# Patient Record
Sex: MALE | Race: WHITE | NOT HISPANIC OR LATINO | Employment: OTHER | URBAN - NONMETROPOLITAN AREA
[De-identification: names, ages, dates, MRNs, and addresses within clinical notes are randomized per-mention and may not be internally consistent; named-entity substitution may affect disease eponyms.]

---

## 2020-11-06 DIAGNOSIS — N21.0 BLADDER STONE: Primary | ICD-10-CM

## 2020-11-09 NOTE — PROGRESS NOTES
Mr. Beltran is 65 y.o. male    CHIEF COMPLAINT: Urinary Incontinence. Imaging was done at [facility] and brought to clinic on Disc. Imaging was also done at [facility].     HPI  This 65-year-old white male is here today for significant male lower urinary tract symptoms.  These have been going on for about 6 to 7 months.  Please see the medical record summary below.  Basically he presented with a right distal ureteral stone and multiple stones of the bladder prostatic urethra.  After endoscopic intervention by Dr. Spain patient had done reasonably well until about 2 months ago.  He has had progressive difficulty with lower urinary tract symptoms of urgency and frequency.  Said his stream has weakened somewhat as well.  He has had some intermittent gross hematuria.  He denies any episodes of urinary tract infection.  He has had no flank pain.    The following portions of the patient's history were reviewed and updated as appropriate: allergies, current medications, past family history, past medical history, past social history, past surgical history and problem list.      Review of Systems   Constitutional: Negative for appetite change and fever.   HENT: Negative for hearing loss and sore throat.    Eyes: Negative for pain and redness.   Respiratory: Negative for cough and shortness of breath.    Cardiovascular: Negative for chest pain and leg swelling.   Gastrointestinal: Negative for anal bleeding, nausea and vomiting.   Endocrine: Negative for cold intolerance and heat intolerance.   Genitourinary: Positive for frequency and urgency. Negative for dysuria, flank pain and hematuria.   Musculoskeletal: Negative for joint swelling and myalgias.   Skin: Negative for color change and rash.   Allergic/Immunologic: Negative for immunocompromised state.   Neurological: Negative for dizziness and speech difficulty.   Hematological: Negative for adenopathy. Does not bruise/bleed easily.   Psychiatric/Behavioral: Negative for  "dysphoric mood and suicidal ideas.         Current Outpatient Medications:   •  finasteride (PROSCAR) 5 MG tablet, Take 5 mg by mouth Daily., Disp: , Rfl:   •  nitrofurantoin (MACRODANTIN) 50 MG capsule, Take 50 mg by mouth 4 (Four) Times a Day., Disp: , Rfl:   •  tamsulosin (FLOMAX) 0.4 MG capsule 24 hr capsule, Take 1 capsule by mouth Daily., Disp: , Rfl:     Past Medical History:   Diagnosis Date   • Arthritis    • Bladder stones    • Kidney stones    • Urinary incontinence        Past Surgical History:   Procedure Laterality Date   • KIDNEY STONE SURGERY         Social History     Socioeconomic History   • Marital status:      Spouse name: Not on file   • Number of children: Not on file   • Years of education: Not on file   • Highest education level: Not on file   Tobacco Use   • Smoking status: Never Smoker   • Smokeless tobacco: Never Used   Substance and Sexual Activity   • Alcohol use: Yes     Comment: occasional   • Sexual activity: Defer       Family History   Problem Relation Age of Onset   • Sudden death Neg Hx    • Urolithiasis Neg Hx          Temp 97.6 °F (36.4 °C)   Ht 175.3 cm (69\")   Wt 89.5 kg (197 lb 6.4 oz)   BMI 29.15 kg/m²       Physical Exam  Constitutional: Well nourished, Well developed; No apparent distress.  His vital signs are reviewed  Psychiatric: Appropriate affect; Alert and oriented  Eyes: Unremarkable  Musculoskeletal: Normal gait and station  GI: Abdomen is soft, non-tender  Respiratory: No distress; Unlabored movement; No accessory musculature needed with symmetric movements  Skin: No pallor or diaphoresis  ; Penis and testicles are normal; Prostate 30 mL with some asymmetry with the right side appearing atrophic not nodular compared to the left.          Data  Results for orders placed or performed in visit on 11/17/20   POC Urinalysis Dipstick, Multipro    Specimen: Urine   Result Value Ref Range    Color Yellow Yellow, Straw, Dark Yellow, [place]    Clarity, UA Clear " Clear    Glucose, UA Negative Negative, 1000 mg/dL (3+) mg/dL    Bilirubin Negative Negative    Ketones, UA Negative Negative    Specific Gravity  1.020 1.005 - 1.030    Blood, UA Trace (A) Negative    pH, Urine 5.5 5.0 - 8.0    Protein, POC Trace (A) Negative mg/dL    Urobilinogen, UA Normal Normal    Nitrite, UA Negative Negative    Leukocytes Trace (A) Negative       International Prostate Symptom Score  The following is posted based on patient questionnaire answers:  0 - not at all    1-7 mild symptoms  1- Less than one time in five  8-19 moderate symptoms  2 -Less than half the time  20-35 severe symptoms  3 - About half the time  4 - More than half the time  5 - Almost always     For following sections:  Incomplete Emptying: - How often have you had the sensation  of not emptying your bladder completely after you finished urinating?  2  Frequency: -How often have you had to urinate again less than   two hours after you finished urinating?      4  Intermittency: -How often have you found you stopped and started again  Several times when you urinate?       0  Urgency: -How often do you find it difficult to postpone urination?             2  Weak stream: - How often have you had a weak urinary stream?             1  Straining: - How often have you had to push or strain to begin  Urination?          0  Sleeping: -How many times did you most typically get up to urinate   From the time you went to bed at night until the time you got up in the   2  Morning          Total `  11    Quality of Life  How would you feel if you had to live with your urinary condition the way   4  It is now, no better, no worse for the rest of your life?    Where: 0=delighted; 1= pleased, 2= mostly satisfied, 3= mixed, 4 = mostly  Dissatisfied, 5= Unhappy, 6 = terrible    Bladder Scan interpretation  Estimation of residual urine via abdominal ultrasound  Residual Urine: 36ml  Indication: Urinary Incontinence  Position: Supine  Examination:  Incremental scanning of the suprapubic area using 3 MHz transducer using copious amounts of acoustic gel.   Findings: An anechoic area was demonstrated which represented the bladder, with measurement of residual urine as noted. I inspected this myself. In that the residual urine was stable or insignificant, no treatment will be necessary at this time.     CT ABDOMEN PELVIS WO CONTRAST- 11/13/2020 12:39 PM CST     HISTORY: Bladder stone; N21.0-Calculus in bladder, previous lithotripsy.     DOSE: 590 mGycm (Automatic exposure control technique was implemented in  an effort to keep the radiation dose as low as possible without  compromising image quality)     REPORT: Spiral CT of the abdomen and pelvis was performed without  contrast from the lung bases through the pubic symphysis. Reconstructed  coronal and sagittal images are also reviewed.     COMPARISON: There are no correlative imaging studies for comparison.     Review of lung windows demonstrates normal aeration of the lung bases.  There is a 2 mm subpleural nodule in the right middle lobe axial image  13 which is too small to fully characterize but likely benign. There is  mild elevation of the right hemidiaphragm. Decreased attenuation of the  liver parenchyma is noted diffusely compatible with steatosis. The  spleen is homogeneous, normal in size. The stomach is decompressed.  There is a group of coarse calcifications along the posterior margin of  the body the pancreas which in a measure about 2.1 cm. This appears  associated with the nodule slightly larger size, which is inseparable  from the pancreas, this could less likely represent a splenic artery  aneurysm. The adrenal glands are normal. There is a punctate  nephrolithiasis at the inferior pole the right kidney.. The renal  contours are smooth. There is no hydronephrosis. The ureters are  decompressed. There 2 small stones in the bladder, measuring  approximately 9.6 and 7.6 mm. Bladder wall thickening  is present and  there is limited bladder distention. Moderate prostate enlargement is  noted. No free fluid or free air is identified. Bowel loops are normal  caliber. There is mild sigmoid diverticulosis, sigmoid colon is  redundant. The appendix is normal. Review of bone windows shows no acute  abnormality. There are vacuum discs at L3-4 and L4-5, as well as L3-4  with disc space collapse at each of these levels.     IMPRESSION:  1. No ureterolithiasis is identified, there is a punctate  nephrolithiasis at the inferior pole of the right kidney. There are 2  stone fragments in the bladder, measuring approximately 9.6 and 7.6 mm.  There is also circumferential bladder wall thickening.  2. Follow-up CT of the abdomen and pelvis using pancreatic mass protocol  is recommended to further evaluate a partially calcified 2.2 cm nodule  along the posterior pancreatic body, which is difficult to separate from  pancreas as well as the splenic artery. A partially calcified pancreatic  mass and a splenic artery aneurysm are both in the differential  diagnosis.  3. Moderate prostate enlargement and probable BPH.  4. Mild sigmoid diverticulosis without diverticulitis. Normal appendix.  5. Advanced degenerative changes at multiple levels in the lumbar spine.  6. Hepatic steatosis.  This report was finalized on 11/13/2020 12:53 by Dr. Bulmaro Nassar MD.    These images were made available to me to review independently.  I also reviewed the radiologist's report described above with regard to the urologic findings.   /Иван Mejia MD    Medical Records Summary:  Available to me today are  medical records from Dr. Spain, urologist in [place], that can be summarized as follows:   The patient presented to Dr. Spain on 5/7/2020 with right lower quadrant pain associated with nausea vomiting consistent with renal colic.  A CT done using stone protocol showed what was felt to be an 8 mm right distal ureteral stone with mild  right hydroureteronephrosis but no stones in either kidney.  Patient was taken the operating room where he underwent right ureteroscopic laser lithotripsy as well as treatment of multiple large bladder and prostatic urethral stones.  When seen back in the office on 5/12/2020 he continued to have significant obstructive voiding symptoms and it was recommended he consider greenlight laser ablation of the prostate.  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  At that point he was put on tamsulosin 0.4 mg/day.  He did undergo a renal sonogram on 6/4/2020 which showed enlarged prostate gland but no evidence of hydronephrosis, renal atrophy or stones.  Ultimately he opted to stay on the tamsulosin.      Assessment and Plan  Diagnoses and all orders for this visit:    1. Bladder calculus (Primary)  -     POC Urinalysis Dipstick, Multipro  -     Case Request; Standing  -     sodium chloride 0.9 % infusion  -     ceFAZolin (ANCEF) 2 g in sodium chloride 0.9 % 100 mL IVPB  -     Case Request    2. BPH with obstruction/lower urinary tract symptoms    Other orders  -     Follow Anesthesia Guidelines / Protocol; Future  -     Obtain Informed Consent; Future  -     Provide NPO Instructions to Patient; Future  -     Follow Anesthesia Guidelines / Protocol; Standing  -     Verify NPO Status; Standing      -Patient still has small stones apparently left in the bladder and the urethra.  He is likely made some new ones in this length of time.  I think ultimately he is going to require some relief of his bladder outlet obstruction.  My plan would be to go in now and try to remove these bladder stones and assess the inside the prostate urethra as well as bladder.  Ultimately I would probably do a transurethral section prostate on the patient which I briefly went over.  At this point we are under a crisis  with capacity due to the coronavirus and have had to postpone any procedures that might require hospitalization except an emergent or extreme situations.  At this time he is relatively asymptomatic so I am going to do a diagnostic exam on him and just try to get out what stones I can which should allow me to treat him as an outpatient.  We talked about the risk of bladder perforation and significant hematuria that could even cause retention in the postoperative period.    (Please note that portions of this note were completed with a voice recognition program.)  Иван Mejia MD  11/18/20  10:22 CST

## 2020-11-09 NOTE — H&P (VIEW-ONLY)
Mr. Beltran is 65 y.o. male    CHIEF COMPLAINT: Urinary Incontinence. Imaging was done at [facility] and brought to clinic on Disc. Imaging was also done at [facility].     HPI  This 65-year-old white male is here today for significant male lower urinary tract symptoms.  These have been going on for about 6 to 7 months.  Please see the medical record summary below.  Basically he presented with a right distal ureteral stone and multiple stones of the bladder prostatic urethra.  After endoscopic intervention by Dr. Spain patient had done reasonably well until about 2 months ago.  He has had progressive difficulty with lower urinary tract symptoms of urgency and frequency.  Said his stream has weakened somewhat as well.  He has had some intermittent gross hematuria.  He denies any episodes of urinary tract infection.  He has had no flank pain.    The following portions of the patient's history were reviewed and updated as appropriate: allergies, current medications, past family history, past medical history, past social history, past surgical history and problem list.      Review of Systems   Constitutional: Negative for appetite change and fever.   HENT: Negative for hearing loss and sore throat.    Eyes: Negative for pain and redness.   Respiratory: Negative for cough and shortness of breath.    Cardiovascular: Negative for chest pain and leg swelling.   Gastrointestinal: Negative for anal bleeding, nausea and vomiting.   Endocrine: Negative for cold intolerance and heat intolerance.   Genitourinary: Positive for frequency and urgency. Negative for dysuria, flank pain and hematuria.   Musculoskeletal: Negative for joint swelling and myalgias.   Skin: Negative for color change and rash.   Allergic/Immunologic: Negative for immunocompromised state.   Neurological: Negative for dizziness and speech difficulty.   Hematological: Negative for adenopathy. Does not bruise/bleed easily.   Psychiatric/Behavioral: Negative for  "dysphoric mood and suicidal ideas.         Current Outpatient Medications:   •  finasteride (PROSCAR) 5 MG tablet, Take 5 mg by mouth Daily., Disp: , Rfl:   •  nitrofurantoin (MACRODANTIN) 50 MG capsule, Take 50 mg by mouth 4 (Four) Times a Day., Disp: , Rfl:   •  tamsulosin (FLOMAX) 0.4 MG capsule 24 hr capsule, Take 1 capsule by mouth Daily., Disp: , Rfl:     Past Medical History:   Diagnosis Date   • Arthritis    • Bladder stones    • Kidney stones    • Urinary incontinence        Past Surgical History:   Procedure Laterality Date   • KIDNEY STONE SURGERY         Social History     Socioeconomic History   • Marital status:      Spouse name: Not on file   • Number of children: Not on file   • Years of education: Not on file   • Highest education level: Not on file   Tobacco Use   • Smoking status: Never Smoker   • Smokeless tobacco: Never Used   Substance and Sexual Activity   • Alcohol use: Yes     Comment: occasional   • Sexual activity: Defer       Family History   Problem Relation Age of Onset   • Sudden death Neg Hx    • Urolithiasis Neg Hx          Temp 97.6 °F (36.4 °C)   Ht 175.3 cm (69\")   Wt 89.5 kg (197 lb 6.4 oz)   BMI 29.15 kg/m²       Physical Exam  Constitutional: Well nourished, Well developed; No apparent distress.  His vital signs are reviewed  Psychiatric: Appropriate affect; Alert and oriented  Eyes: Unremarkable  Musculoskeletal: Normal gait and station  GI: Abdomen is soft, non-tender  Respiratory: No distress; Unlabored movement; No accessory musculature needed with symmetric movements  Skin: No pallor or diaphoresis  ; Penis and testicles are normal; Prostate 30 mL with some asymmetry with the right side appearing atrophic not nodular compared to the left.          Data  Results for orders placed or performed in visit on 11/17/20   POC Urinalysis Dipstick, Multipro    Specimen: Urine   Result Value Ref Range    Color Yellow Yellow, Straw, Dark Yellow, [place]hell    Clarity, UA Clear " Clear    Glucose, UA Negative Negative, 1000 mg/dL (3+) mg/dL    Bilirubin Negative Negative    Ketones, UA Negative Negative    Specific Gravity  1.020 1.005 - 1.030    Blood, UA Trace (A) Negative    pH, Urine 5.5 5.0 - 8.0    Protein, POC Trace (A) Negative mg/dL    Urobilinogen, UA Normal Normal    Nitrite, UA Negative Negative    Leukocytes Trace (A) Negative       International Prostate Symptom Score  The following is posted based on patient questionnaire answers:  0 - not at all    1-7 mild symptoms  1- Less than one time in five  8-19 moderate symptoms  2 -Less than half the time  20-35 severe symptoms  3 - About half the time  4 - More than half the time  5 - Almost always     For following sections:  Incomplete Emptying: - How often have you had the sensation  of not emptying your bladder completely after you finished urinating?  2  Frequency: -How often have you had to urinate again less than   two hours after you finished urinating?      4  Intermittency: -How often have you found you stopped and started again  Several times when you urinate?       0  Urgency: -How often do you find it difficult to postpone urination?             2  Weak stream: - How often have you had a weak urinary stream?             1  Straining: - How often have you had to push or strain to begin  Urination?          0  Sleeping: -How many times did you most typically get up to urinate   From the time you went to bed at night until the time you got up in the   2  Morning          Total `  11    Quality of Life  How would you feel if you had to live with your urinary condition the way   4  It is now, no better, no worse for the rest of your life?    Where: 0=delighted; 1= pleased, 2= mostly satisfied, 3= mixed, 4 = mostly  Dissatisfied, 5= Unhappy, 6 = terrible    Bladder Scan interpretation  Estimation of residual urine via abdominal ultrasound  Residual Urine: 36ml  Indication: Urinary Incontinence  Position: Supine  Examination:  Incremental scanning of the suprapubic area using 3 MHz transducer using copious amounts of acoustic gel.   Findings: An anechoic area was demonstrated which represented the bladder, with measurement of residual urine as noted. I inspected this myself. In that the residual urine was stable or insignificant, no treatment will be necessary at this time.     CT ABDOMEN PELVIS WO CONTRAST- 11/13/2020 12:39 PM CST     HISTORY: Bladder stone; N21.0-Calculus in bladder, previous lithotripsy.     DOSE: 590 mGycm (Automatic exposure control technique was implemented in  an effort to keep the radiation dose as low as possible without  compromising image quality)     REPORT: Spiral CT of the abdomen and pelvis was performed without  contrast from the lung bases through the pubic symphysis. Reconstructed  coronal and sagittal images are also reviewed.     COMPARISON: There are no correlative imaging studies for comparison.     Review of lung windows demonstrates normal aeration of the lung bases.  There is a 2 mm subpleural nodule in the right middle lobe axial image  13 which is too small to fully characterize but likely benign. There is  mild elevation of the right hemidiaphragm. Decreased attenuation of the  liver parenchyma is noted diffusely compatible with steatosis. The  spleen is homogeneous, normal in size. The stomach is decompressed.  There is a group of coarse calcifications along the posterior margin of  the body the pancreas which in a measure about 2.1 cm. This appears  associated with the nodule slightly larger size, which is inseparable  from the pancreas, this could less likely represent a splenic artery  aneurysm. The adrenal glands are normal. There is a punctate  nephrolithiasis at the inferior pole the right kidney.. The renal  contours are smooth. There is no hydronephrosis. The ureters are  decompressed. There 2 small stones in the bladder, measuring  approximately 9.6 and 7.6 mm. Bladder wall thickening  is present and  there is limited bladder distention. Moderate prostate enlargement is  noted. No free fluid or free air is identified. Bowel loops are normal  caliber. There is mild sigmoid diverticulosis, sigmoid colon is  redundant. The appendix is normal. Review of bone windows shows no acute  abnormality. There are vacuum discs at L3-4 and L4-5, as well as L3-4  with disc space collapse at each of these levels.     IMPRESSION:  1. No ureterolithiasis is identified, there is a punctate  nephrolithiasis at the inferior pole of the right kidney. There are 2  stone fragments in the bladder, measuring approximately 9.6 and 7.6 mm.  There is also circumferential bladder wall thickening.  2. Follow-up CT of the abdomen and pelvis using pancreatic mass protocol  is recommended to further evaluate a partially calcified 2.2 cm nodule  along the posterior pancreatic body, which is difficult to separate from  pancreas as well as the splenic artery. A partially calcified pancreatic  mass and a splenic artery aneurysm are both in the differential  diagnosis.  3. Moderate prostate enlargement and probable BPH.  4. Mild sigmoid diverticulosis without diverticulitis. Normal appendix.  5. Advanced degenerative changes at multiple levels in the lumbar spine.  6. Hepatic steatosis.  This report was finalized on 11/13/2020 12:53 by Dr. Bulmaro Nassar MD.    These images were made available to me to review independently.  I also reviewed the radiologist's report described above with regard to the urologic findings.   /Иван Mejia MD    Medical Records Summary:  Available to me today are  medical records from Dr. Spain, urologist in [place], that can be summarized as follows:   The patient presented to Dr. Spain on 5/7/2020 with right lower quadrant pain associated with nausea vomiting consistent with renal colic.  A CT done using stone protocol showed what was felt to be an 8 mm right distal ureteral stone with mild  right hydroureteronephrosis but no stones in either kidney.  Patient was taken the operating room where he underwent right ureteroscopic laser lithotripsy as well as treatment of multiple large bladder and prostatic urethral stones.  When seen back in the office on 5/12/2020 he continued to have significant obstructive voiding symptoms and it was recommended he consider greenlight laser ablation of the prostate.  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  At that point he was put on tamsulosin 0.4 mg/day.  He did undergo a renal sonogram on 6/4/2020 which showed enlarged prostate gland but no evidence of hydronephrosis, renal atrophy or stones.  Ultimately he opted to stay on the tamsulosin.      Assessment and Plan  Diagnoses and all orders for this visit:    1. Bladder calculus (Primary)  -     POC Urinalysis Dipstick, Multipro  -     Case Request; Standing  -     sodium chloride 0.9 % infusion  -     ceFAZolin (ANCEF) 2 g in sodium chloride 0.9 % 100 mL IVPB  -     Case Request    2. BPH with obstruction/lower urinary tract symptoms    Other orders  -     Follow Anesthesia Guidelines / Protocol; Future  -     Obtain Informed Consent; Future  -     Provide NPO Instructions to Patient; Future  -     Follow Anesthesia Guidelines / Protocol; Standing  -     Verify NPO Status; Standing      -Patient still has small stones apparently left in the bladder and the urethra.  He is likely made some new ones in this length of time.  I think ultimately he is going to require some relief of his bladder outlet obstruction.  My plan would be to go in now and try to remove these bladder stones and assess the inside the prostate urethra as well as bladder.  Ultimately I would probably do a transurethral section prostate on the patient which I briefly went over.  At this point we are under a crisis  with capacity due to the coronavirus and have had to postpone any procedures that might require hospitalization except an emergent or extreme situations.  At this time he is relatively asymptomatic so I am going to do a diagnostic exam on him and just try to get out what stones I can which should allow me to treat him as an outpatient.  We talked about the risk of bladder perforation and significant hematuria that could even cause retention in the postoperative period.    (Please note that portions of this note were completed with a voice recognition program.)  Иван Mejia MD  11/18/20  10:22 CST

## 2020-11-10 RX ORDER — TAMSULOSIN HYDROCHLORIDE 0.4 MG/1
1 CAPSULE ORAL DAILY
COMMUNITY

## 2020-11-13 ENCOUNTER — HOSPITAL ENCOUNTER (OUTPATIENT)
Dept: CT IMAGING | Facility: HOSPITAL | Age: 65
Discharge: HOME | End: 2020-11-13
Admitting: UROLOGY

## 2020-11-13 DIAGNOSIS — N21.0 BLADDER STONE: ICD-10-CM

## 2020-11-13 PROCEDURE — 74176 CT ABD & PELVIS W/O CONTRAST: CPT

## 2020-11-17 ENCOUNTER — OFFICE VISIT (OUTPATIENT)
Dept: UROLOGY | Facility: CLINIC | Age: 65
End: 2020-11-17

## 2020-11-17 VITALS — HEIGHT: 69 IN | TEMPERATURE: 97.6 F | WEIGHT: 197.4 LBS | BODY MASS INDEX: 29.24 KG/M2

## 2020-11-17 DIAGNOSIS — N40.1 BPH WITH OBSTRUCTION/LOWER URINARY TRACT SYMPTOMS: ICD-10-CM

## 2020-11-17 DIAGNOSIS — N13.8 BPH WITH OBSTRUCTION/LOWER URINARY TRACT SYMPTOMS: ICD-10-CM

## 2020-11-17 DIAGNOSIS — N21.0 BLADDER CALCULUS: Primary | ICD-10-CM

## 2020-11-17 LAB
BILIRUB BLD-MCNC: NEGATIVE MG/DL
CLARITY, POC: CLEAR
COLOR UR: YELLOW
GLUCOSE UR STRIP-MCNC: NEGATIVE MG/DL
KETONES UR QL: NEGATIVE
LEUKOCYTE EST, POC: ABNORMAL
NITRITE UR-MCNC: NEGATIVE MG/ML
PH UR: 5.5 [PH] (ref 5–8)
PROT UR STRIP-MCNC: ABNORMAL MG/DL
RBC # UR STRIP: ABNORMAL /UL
SP GR UR: 1.02 (ref 1–1.03)
UROBILINOGEN UR QL: NORMAL

## 2020-11-17 PROCEDURE — 81003 URINALYSIS AUTO W/O SCOPE: CPT | Performed by: UROLOGY

## 2020-11-17 PROCEDURE — 51798 US URINE CAPACITY MEASURE: CPT | Performed by: UROLOGY

## 2020-11-17 PROCEDURE — 99205 OFFICE O/P NEW HI 60 MIN: CPT | Performed by: UROLOGY

## 2020-11-17 RX ORDER — NITROFURANTOIN MACROCRYSTALS 50 MG/1
50 CAPSULE ORAL NIGHTLY
COMMUNITY

## 2020-11-17 RX ORDER — FINASTERIDE 5 MG/1
5 TABLET, FILM COATED ORAL DAILY
COMMUNITY

## 2020-11-17 RX ORDER — SODIUM CHLORIDE 9 MG/ML
100 INJECTION, SOLUTION INTRAVENOUS CONTINUOUS
Status: CANCELLED | OUTPATIENT
Start: 2020-11-17

## 2020-11-18 ENCOUNTER — TRANSCRIBE ORDERS (OUTPATIENT)
Dept: ADMINISTRATIVE | Facility: HOSPITAL | Age: 65
End: 2020-11-18

## 2020-11-18 DIAGNOSIS — K86.89 PANCREATIC MASS: Primary | ICD-10-CM

## 2020-11-18 DIAGNOSIS — Z11.59 SCREENING FOR VIRAL DISEASE: Primary | ICD-10-CM

## 2020-11-20 ENCOUNTER — APPOINTMENT (OUTPATIENT)
Dept: PREADMISSION TESTING | Facility: HOSPITAL | Age: 65
End: 2020-11-20

## 2020-11-20 ENCOUNTER — LAB (OUTPATIENT)
Dept: LAB | Facility: HOSPITAL | Age: 65
End: 2020-11-20

## 2020-11-20 VITALS
OXYGEN SATURATION: 98 % | HEIGHT: 68 IN | RESPIRATION RATE: 16 BRPM | SYSTOLIC BLOOD PRESSURE: 133 MMHG | HEART RATE: 77 BPM | DIASTOLIC BLOOD PRESSURE: 82 MMHG | BODY MASS INDEX: 29.87 KG/M2 | WEIGHT: 197.09 LBS

## 2020-11-20 LAB
DEPRECATED RDW RBC AUTO: 41.7 FL (ref 37–54)
ERYTHROCYTE [DISTWIDTH] IN BLOOD BY AUTOMATED COUNT: 12.8 % (ref 12.3–15.4)
HCT VFR BLD AUTO: 43.9 % (ref 37.5–51)
HGB BLD-MCNC: 14.3 G/DL (ref 13–17.7)
MCH RBC QN AUTO: 28.8 PG (ref 26.6–33)
MCHC RBC AUTO-ENTMCNC: 32.6 G/DL (ref 31.5–35.7)
MCV RBC AUTO: 88.5 FL (ref 79–97)
PLATELET # BLD AUTO: 272 10*3/MM3 (ref 140–450)
PMV BLD AUTO: 10.1 FL (ref 6–12)
RBC # BLD AUTO: 4.96 10*6/MM3 (ref 4.14–5.8)
WBC # BLD AUTO: 6.01 10*3/MM3 (ref 3.4–10.8)

## 2020-11-20 PROCEDURE — 36415 COLL VENOUS BLD VENIPUNCTURE: CPT

## 2020-11-20 PROCEDURE — 93010 ELECTROCARDIOGRAM REPORT: CPT | Performed by: INTERNAL MEDICINE

## 2020-11-20 PROCEDURE — 85027 COMPLETE CBC AUTOMATED: CPT

## 2020-11-20 PROCEDURE — U0003 INFECTIOUS AGENT DETECTION BY NUCLEIC ACID (DNA OR RNA); SEVERE ACUTE RESPIRATORY SYNDROME CORONAVIRUS 2 (SARS-COV-2) (CORONAVIRUS DISEASE [COVID-19]), AMPLIFIED PROBE TECHNIQUE, MAKING USE OF HIGH THROUGHPUT TECHNOLOGIES AS DESCRIBED BY CMS-2020-01-R: HCPCS | Performed by: UROLOGY

## 2020-11-20 PROCEDURE — C9803 HOPD COVID-19 SPEC COLLECT: HCPCS | Performed by: UROLOGY

## 2020-11-20 PROCEDURE — 93005 ELECTROCARDIOGRAM TRACING: CPT

## 2020-11-20 NOTE — DISCHARGE INSTRUCTIONS
DAY OF SURGERY INSTRUCTIONS        YOUR SURGEON: ELIZABETH ROACH    PROCEDURE: CYSTOSCOPY LASER LITHOTRIPSY AND REMOVAL BLADDER STONES    DATE OF SURGERY: November 23, 2020    ARRIVAL TIME: AS DIRECTED BY OFFICE    YOU MAY TAKE THE FOLLOWING MEDICATION(S) THE MORNING OF SURGERY WITH A SIP OF WATER: NONE    ALL OTHER HOME MEDICATIONS CHECK WITH YOUR DOCTOR    DO NOT TAKE ANY ERECTILE DYSFUNCTION MEDICATIONS (EX:  CIALIS, VIAGRA) 24 HOURS PRIOR TO SURGERY              MANAGING PAIN AFTER SURGERY    We know you are probably wondering what your pain will be like after surgery.  Following surgery it is unrealistic to expect you will not have pain.   Pain is how our bodies let us know that something is wrong or cautions us to be careful.  That said, our goal is to make your pain tolerable.    Methods we may use to treat your pain include (oral or IV medications, PCAs, epidurals, nerve blocks, etc.)   While some procedures require IV pain medications for a short time after surgery, transitioning to pain medications by mouth allows for better management of pain.   Your nurse will encourage you to take oral pain medications whenever possible.  IV medications work almost immediately, but only last a short while.  Taking medications by mouth allows for a more constant level of medication in your blood stream for a longer period of time.      Once your pain is out of control it is harder to get back under control.  It is important you are aware when your next dose of pain medication is due.  If you are admitted, your nurse may write the time of your next dose on the white board in your room to help you remember.      We are interested in your pain and encourage you to inform us about aggravating factors during your visit.   Many times a simple repositioning every few hours can make a big difference.    If your physician says it is okay, do not let your pain prevent you from getting out of bed. Be sure to call your nurse for  assistance prior to getting up so you do not fall.      Before surgery, please decide your tolerable pain goal.  These faces help describe the pain ratings we use on a 0-10 scale.   Be prepared to tell us your goal and whether or not you take pain or anxiety medications at home.      BEFORE YOU COME TO THE HOSPITAL  (Pre-op instructions)  • Do not eat, drink, smoke or chew gum after midnight the night before surgery.  This also includes no mints.  • Morning of surgery take only the medicines you have been instructed with a sip of water unless otherwise instructed  by your physician.  • Do not shave, wear makeup or dark nail polish.  • Remove all jewelry including rings.  • Leave anything you consider valuable at home.  • Leave your suitcase in the car until after your surgery.  • Bring the following with you if applicable:  o Picture ID and insurance, Medicare or Medicaid cards  o Co-pay/deductible required by insurance (cash, check, credit card)  o Copy of advance directive, living will or power-of- documents if not brought to PAT  o CPAP or BIPAP mask and tubing  o Relaxation aids ( book, magazine), etc.  o Hearing aids                                 ON THE DAY OF SURGERY  · On the day of surgery check in at registration located at the main entrance of the hospital.   ? You will be registered and given a beeper with instructions where to wait in the main lobby.  ? When your beeper lights up and vibrates a member of the Outpatient Surgery staff will meet you at the double doors under the stair steps and escort you to your preoperative room.   · You may have cloth compression devices placed on your legs. These help to prevent blood clots and reduce swelling in your legs.  · An IV may be inserted into one of your veins.  · In the operating room, you may be given one or more of the following:  ? A medicine to help you relax (sedative).  ? A medicine to numb the area (local anesthetic).  ? A medicine to make  "you fall asleep (general anesthetic).  ? A medicine that is injected into an area of your body to numb everything below the injection site (regional anesthetic).  · Your surgical site will be marked or identified.  · You may be given an antibiotic through your IV to help prevent infection.  Contact a health care provider if you:  · Develop a fever of more than 100.4°F (38°C) or other feelings of illness during the 48 hours before your surgery.  · Have symptoms that get worse.  Have questions or concerns about your surgery    General Anesthesia/Surgery, Adult  General anesthesia is the use of medicines to make a person \"go to sleep\" (unconscious) for a medical procedure. General anesthesia must be used for certain procedures, and is often recommended for procedures that:  · Last a long time.  · Require you to be still or in an unusual position.  · Are major and can cause blood loss.  The medicines used for general anesthesia are called general anesthetics. As well as making you unconscious for a certain amount of time, these medicines:  · Prevent pain.  · Control your blood pressure.  · Relax your muscles.  Tell a health care provider about:  · Any allergies you have.  · All medicines you are taking, including vitamins, herbs, eye drops, creams, and over-the-counter medicines.  · Any problems you or family members have had with anesthetic medicines.  · Types of anesthetics you have had in the past.  · Any blood disorders you have.  · Any surgeries you have had.  · Any medical conditions you have.  · Any recent upper respiratory, chest, or ear infections.  · Any history of:  ? Heart or lung conditions, such as heart failure, sleep apnea, asthma, or chronic obstructive pulmonary disease (COPD).  ?  service.  ? Depression or anxiety.  · Any tobacco or drug use, including marijuana or alcohol use.  · Whether you are pregnant or may be pregnant.  What are the risks?  Generally, this is a safe procedure. However, " problems may occur, including:  · Allergic reaction.  · Lung and heart problems.  · Inhaling food or liquid from the stomach into the lungs (aspiration).  · Nerve injury.  · Air in the bloodstream, which can lead to stroke.  · Extreme agitation or confusion (delirium) when you wake up from the anesthetic.  · Waking up during your procedure and being unable to move. This is rare.  These problems are more likely to develop if you are having a major surgery or if you have an advanced or serious medical condition. You can prevent some of these complications by answering all of your health care provider's questions thoroughly and by following all instructions before your procedure.  General anesthesia can cause side effects, including:  · Nausea or vomiting.  · A sore throat from the breathing tube.  · Hoarseness.  · Wheezing or coughing.  · Shaking chills.  · Tiredness.  · Body aches.  · Anxiety.  · Sleepiness or drowsiness.  · Confusion or agitation.  RISKS AND COMPLICATIONS OF SURGERY  Your health care provider will discuss possible risks and complications with you before surgery. Common risks and complications include:    · Problems due to the use of anesthetics.  · Blood loss and replacement (does not apply to minor surgical procedures).  · Temporary increase in pain due to surgery.  · Uncorrected pain or problems that the surgery was meant to correct.  · Infection.  · New damage.    What happens before the procedure?    Medicines  Ask your health care provider about:  · Changing or stopping your regular medicines. This is especially important if you are taking diabetes medicines or blood thinners.  · Taking medicines such as aspirin and ibuprofen. These medicines can thin your blood. Do not take these medicines unless your health care provider tells you to take them.  · Taking over-the-counter medicines, vitamins, herbs, and supplements. Do not take these during the week before your procedure unless your health  care provider approves them.  General instructions  · Starting 3-6 weeks before the procedure, do not use any products that contain nicotine or tobacco, such as cigarettes and e-cigarettes. If you need help quitting, ask your health care provider.  · If you brush your teeth on the morning of the procedure, make sure to spit out all of the toothpaste.  · Tell your health care provider if you become ill or develop a cold, cough, or fever.  · If instructed by your health care provider, bring your sleep apnea device with you on the day of your surgery (if applicable).  · Ask your health care provider if you will be going home the same day, the following day, or after a longer hospital stay.  ? Plan to have someone take you home from the hospital or clinic.  ? Plan to have a responsible adult care for you for at least 24 hours after you leave the hospital or clinic. This is important.  What happens during the procedure?  · You will be given anesthetics through both of the following:  ? A mask placed over your nose and mouth.  ? An IV in one of your veins.  · You may receive a medicine to help you relax (sedative).  · After you are unconscious, a breathing tube may be inserted down your throat to help you breathe. This will be removed before you wake up.  · An anesthesia specialist will stay with you throughout your procedure. He or she will:  ? Keep you comfortable and safe by continuing to give you medicines and adjusting the amount of medicine that you get.  ? Monitor your blood pressure, pulse, and oxygen levels to make sure that the anesthetics do not cause any problems.  The procedure may vary among health care providers and hospitals.  What happens after the procedure?  · Your blood pressure, temperature, heart rate, breathing rate, and blood oxygen level will be monitored until the medicines you were given have worn off.  · You will wake up in a recovery area. You may wake up slowly.  · If you feel anxious or  agitated, you may be given medicine to help you calm down.  · If you will be going home the same day, your health care provider may check to make sure you can walk, drink, and urinate.  · Your health care provider will treat any pain or side effects you have before you go home.  · Do not drive for 24 hours if you were given a sedative.  Summary  · General anesthesia is used to keep you still and prevent pain during a procedure.  · It is important to tell your healthcare provider about your medical history and any surgeries you have had, and previous experience with anesthesia.  · Follow your healthcare provider’s instructions about when to stop eating, drinking, or taking certain medicines before your procedure.  · Plan to have someone take you home from the hospital or clinic.  This information is not intended to replace advice given to you by your health care provider. Make sure you discuss any questions you have with your health care provider.  Document Released: 03/26/2009 Document Revised: 08/03/2018 Document Reviewed: 08/03/2018  [employer] Patient Education © 2019 [employer]      Fall Prevention in Hospitals, Adult  As a hospital patient, your condition and the treatments you receive can increase your risk for falls. Some additional risk factors for falls in a hospital include:  · Being in an unfamiliar environment.  · Being on bed rest.  · Your surgery.  · Taking certain medicines.  · Your tubing requirements, such as intravenous (IV) therapy or catheters.  It is important that you learn how to decrease fall risks while at the hospital. Below are important tips that can help prevent falls.  SAFETY TIPS FOR PREVENTING FALLS  Talk about your risk of falling.  · Ask your health care provider why you are at risk for falling. Is it your medicine, illness, tubing placement, or something else?  · Make a plan with your health care provider to keep you safe from falls.  · Ask your health care provider or  pharmacist about side effects of your medicines. Some medicines can make you dizzy or affect your coordination.  Ask for help.  · Ask for help before getting out of bed. You may need to press your call button.  · Ask for assistance in getting safely to the toilet.  · Ask for a walker or cane to be put at your bedside. Ask that most of the side rails on your bed be placed up before your health care provider leaves the room.  · Ask family or friends to sit with you.  · Ask for things that are out of your reach, such as your glasses, hearing aids, telephone, bedside table, or call button.  Follow these tips to avoid falling:  · Stay lying or seated, rather than standing, while waiting for help.  · Wear rubber-soled slippers or shoes whenever you walk in the hospital.  · Avoid quick, sudden movements.  ¨ Change positions slowly.  ¨ Sit on the side of your bed before standing.  ¨ Stand up slowly and wait before you start to walk.  · Let your health care provider know if there is a spill on the floor.  · Pay careful attention to the medical equipment, electrical cords, and tubes around you.  · When you need help, use your call button by your bed or in the bathroom. Wait for one of your health care providers to help you.  · If you feel dizzy or unsure of your footing, return to bed and wait for assistance.  · Avoid being distracted by the TV, telephone, or another person in your room.  · Do not lean or support yourself on rolling objects, such as IV poles or bedside tables.     This information is not intended to replace advice given to you by your health care provider. Make sure you discuss any questions you have with your health care provider.     Document Released: 12/15/2001 Document Revised: 01/08/2016 Document Reviewed: 08/25/2013  [employer] Interactive Patient Education ©2016 [employer]            PATIENT/FAMILY/RESPONSIBLE PARTY VERBALIZES UNDERSTANDING OF ABOVE EDUCATION.  COPY OF PAIN SCALE GIVEN AND REVIEWED  WITH VERBALIZED UNDERSTANDING.

## 2020-11-21 LAB
COVID LABCORP PRIORITY: NORMAL
QT INTERVAL: 362 MS
QTC INTERVAL: 407 MS
SARS-COV-2 RNA RESP QL NAA+PROBE: NOT DETECTED

## 2020-11-23 ENCOUNTER — ANESTHESIA (OUTPATIENT)
Dept: PERIOP | Facility: HOSPITAL | Age: 65
End: 2020-11-23

## 2020-11-23 ENCOUNTER — HOSPITAL ENCOUNTER (OUTPATIENT)
Facility: HOSPITAL | Age: 65
Setting detail: HOSPITAL OUTPATIENT SURGERY
Discharge: HOME | End: 2020-11-23
Attending: UROLOGY | Admitting: UROLOGY

## 2020-11-23 ENCOUNTER — ANESTHESIA EVENT (OUTPATIENT)
Dept: PERIOP | Facility: HOSPITAL | Age: 65
End: 2020-11-23

## 2020-11-23 VITALS
SYSTOLIC BLOOD PRESSURE: 113 MMHG | HEART RATE: 79 BPM | TEMPERATURE: 97.4 F | DIASTOLIC BLOOD PRESSURE: 57 MMHG | OXYGEN SATURATION: 98 % | RESPIRATION RATE: 20 BRPM

## 2020-11-23 DIAGNOSIS — N21.0 BLADDER CALCULUS: ICD-10-CM

## 2020-11-23 PROCEDURE — 82360 CALCULUS ASSAY QUANT: CPT | Performed by: UROLOGY

## 2020-11-23 PROCEDURE — 25010000002 PROPOFOL 10 MG/ML EMULSION: Performed by: NURSE ANESTHETIST, CERTIFIED REGISTERED

## 2020-11-23 PROCEDURE — 25010000002 FENTANYL CITRATE (PF) 250 MCG/5ML SOLUTION: Performed by: NURSE ANESTHETIST, CERTIFIED REGISTERED

## 2020-11-23 PROCEDURE — 25010000002 PHENYLEPHRINE HCL 0.8 MG/10ML SOLUTION PREFILLED SYRINGE: Performed by: NURSE ANESTHETIST, CERTIFIED REGISTERED

## 2020-11-23 PROCEDURE — 25010000002 ONDANSETRON PER 1 MG: Performed by: NURSE ANESTHETIST, CERTIFIED REGISTERED

## 2020-11-23 PROCEDURE — 88300 SURGICAL PATH GROSS: CPT | Performed by: UROLOGY

## 2020-11-23 PROCEDURE — 25010000002 DEXAMETHASONE PER 1 MG: Performed by: NURSE ANESTHETIST, CERTIFIED REGISTERED

## 2020-11-23 PROCEDURE — 52317 REMOVE BLADDER STONE: CPT | Performed by: UROLOGY

## 2020-11-23 PROCEDURE — 25010000003 CEFAZOLIN PER 500 MG: Performed by: NURSE ANESTHETIST, CERTIFIED REGISTERED

## 2020-11-23 RX ORDER — IBUPROFEN 600 MG/1
600 TABLET ORAL ONCE AS NEEDED
Status: DISCONTINUED | OUTPATIENT
Start: 2020-11-23 | End: 2020-11-23 | Stop reason: HOSPADM

## 2020-11-23 RX ORDER — OXYCODONE AND ACETAMINOPHEN 10; 325 MG/1; MG/1
1 TABLET ORAL ONCE AS NEEDED
Status: DISCONTINUED | OUTPATIENT
Start: 2020-11-23 | End: 2020-11-23 | Stop reason: HOSPADM

## 2020-11-23 RX ORDER — SODIUM CHLORIDE, SODIUM LACTATE, POTASSIUM CHLORIDE, CALCIUM CHLORIDE 600; 310; 30; 20 MG/100ML; MG/100ML; MG/100ML; MG/100ML
9 INJECTION, SOLUTION INTRAVENOUS CONTINUOUS
Status: DISCONTINUED | OUTPATIENT
Start: 2020-11-23 | End: 2020-11-23 | Stop reason: HOSPADM

## 2020-11-23 RX ORDER — ONDANSETRON 2 MG/ML
4 INJECTION INTRAMUSCULAR; INTRAVENOUS ONCE AS NEEDED
Status: DISCONTINUED | OUTPATIENT
Start: 2020-11-23 | End: 2020-11-23 | Stop reason: HOSPADM

## 2020-11-23 RX ORDER — FENTANYL CITRATE 50 UG/ML
INJECTION, SOLUTION INTRAMUSCULAR; INTRAVENOUS AS NEEDED
Status: DISCONTINUED | OUTPATIENT
Start: 2020-11-23 | End: 2020-11-23 | Stop reason: SURG

## 2020-11-23 RX ORDER — FLUMAZENIL 0.1 MG/ML
0.2 INJECTION INTRAVENOUS AS NEEDED
Status: DISCONTINUED | OUTPATIENT
Start: 2020-11-23 | End: 2020-11-23 | Stop reason: HOSPADM

## 2020-11-23 RX ORDER — BUPIVACAINE HCL/0.9 % NACL/PF 0.1 %
2 PLASTIC BAG, INJECTION (ML) EPIDURAL ONCE
Status: DISCONTINUED | OUTPATIENT
Start: 2020-11-23 | End: 2020-11-23 | Stop reason: HOSPADM

## 2020-11-23 RX ORDER — SODIUM CHLORIDE 0.9 % (FLUSH) 0.9 %
10 SYRINGE (ML) INJECTION EVERY 12 HOURS SCHEDULED
Status: DISCONTINUED | OUTPATIENT
Start: 2020-11-23 | End: 2020-11-23 | Stop reason: HOSPADM

## 2020-11-23 RX ORDER — OXYCODONE AND ACETAMINOPHEN 7.5; 325 MG/1; MG/1
2 TABLET ORAL EVERY 4 HOURS PRN
Status: DISCONTINUED | OUTPATIENT
Start: 2020-11-23 | End: 2020-11-23 | Stop reason: HOSPADM

## 2020-11-23 RX ORDER — FENTANYL CITRATE 50 UG/ML
25 INJECTION, SOLUTION INTRAMUSCULAR; INTRAVENOUS
Status: DISCONTINUED | OUTPATIENT
Start: 2020-11-23 | End: 2020-11-23 | Stop reason: HOSPADM

## 2020-11-23 RX ORDER — LIDOCAINE HYDROCHLORIDE 10 MG/ML
0.5 INJECTION, SOLUTION EPIDURAL; INFILTRATION; INTRACAUDAL; PERINEURAL ONCE AS NEEDED
Status: DISCONTINUED | OUTPATIENT
Start: 2020-11-23 | End: 2020-11-23 | Stop reason: HOSPADM

## 2020-11-23 RX ORDER — CEFAZOLIN SODIUM 1 G/3ML
INJECTION, POWDER, FOR SOLUTION INTRAMUSCULAR; INTRAVENOUS AS NEEDED
Status: DISCONTINUED | OUTPATIENT
Start: 2020-11-23 | End: 2020-11-23 | Stop reason: SURG

## 2020-11-23 RX ORDER — HYDROCODONE BITARTRATE AND ACETAMINOPHEN 5; 325 MG/1; MG/1
1 TABLET ORAL EVERY 8 HOURS PRN
Qty: 6 TABLET | Refills: 0 | Status: SHIPPED | OUTPATIENT
Start: 2020-11-23

## 2020-11-23 RX ORDER — NALOXONE HCL 0.4 MG/ML
0.4 VIAL (ML) INJECTION AS NEEDED
Status: DISCONTINUED | OUTPATIENT
Start: 2020-11-23 | End: 2020-11-23 | Stop reason: HOSPADM

## 2020-11-23 RX ORDER — PHENYLEPHRINE HCL IN 0.9% NACL 0.8MG/10ML
SYRINGE (ML) INTRAVENOUS AS NEEDED
Status: DISCONTINUED | OUTPATIENT
Start: 2020-11-23 | End: 2020-11-23 | Stop reason: SURG

## 2020-11-23 RX ORDER — DEXAMETHASONE SODIUM PHOSPHATE 4 MG/ML
INJECTION, SOLUTION INTRA-ARTICULAR; INTRALESIONAL; INTRAMUSCULAR; INTRAVENOUS; SOFT TISSUE AS NEEDED
Status: DISCONTINUED | OUTPATIENT
Start: 2020-11-23 | End: 2020-11-23 | Stop reason: SURG

## 2020-11-23 RX ORDER — PHENAZOPYRIDINE HYDROCHLORIDE 200 MG/1
200 TABLET, FILM COATED ORAL 3 TIMES DAILY PRN
Qty: 9 TABLET | Refills: 0 | Status: SHIPPED | OUTPATIENT
Start: 2020-11-23 | End: 2020-11-26

## 2020-11-23 RX ORDER — SODIUM CHLORIDE 0.9 % (FLUSH) 0.9 %
3 SYRINGE (ML) INJECTION AS NEEDED
Status: DISCONTINUED | OUTPATIENT
Start: 2020-11-23 | End: 2020-11-23 | Stop reason: HOSPADM

## 2020-11-23 RX ORDER — DEXTROSE MONOHYDRATE 25 G/50ML
12.5 INJECTION, SOLUTION INTRAVENOUS AS NEEDED
Status: DISCONTINUED | OUTPATIENT
Start: 2020-11-23 | End: 2020-11-23 | Stop reason: HOSPADM

## 2020-11-23 RX ORDER — ONDANSETRON 2 MG/ML
INJECTION INTRAMUSCULAR; INTRAVENOUS AS NEEDED
Status: DISCONTINUED | OUTPATIENT
Start: 2020-11-23 | End: 2020-11-23 | Stop reason: SURG

## 2020-11-23 RX ORDER — MAGNESIUM HYDROXIDE 1200 MG/15ML
LIQUID ORAL AS NEEDED
Status: DISCONTINUED | OUTPATIENT
Start: 2020-11-23 | End: 2020-11-23 | Stop reason: HOSPADM

## 2020-11-23 RX ORDER — CEPHALEXIN 500 MG/1
500 CAPSULE ORAL 2 TIMES DAILY
Qty: 5 CAPSULE | Refills: 0 | Status: SHIPPED | OUTPATIENT
Start: 2020-11-23

## 2020-11-23 RX ORDER — HYDROCODONE BITARTRATE AND ACETAMINOPHEN 5; 325 MG/1; MG/1
1 TABLET ORAL ONCE AS NEEDED
Status: DISCONTINUED | OUTPATIENT
Start: 2020-11-23 | End: 2020-11-23 | Stop reason: HOSPADM

## 2020-11-23 RX ORDER — PROPOFOL 10 MG/ML
VIAL (ML) INTRAVENOUS AS NEEDED
Status: DISCONTINUED | OUTPATIENT
Start: 2020-11-23 | End: 2020-11-23 | Stop reason: SURG

## 2020-11-23 RX ORDER — SODIUM CHLORIDE 9 MG/ML
100 INJECTION, SOLUTION INTRAVENOUS CONTINUOUS
Status: DISCONTINUED | OUTPATIENT
Start: 2020-11-23 | End: 2020-11-23 | Stop reason: HOSPADM

## 2020-11-23 RX ORDER — SODIUM CHLORIDE 0.9 % (FLUSH) 0.9 %
10 SYRINGE (ML) INJECTION AS NEEDED
Status: DISCONTINUED | OUTPATIENT
Start: 2020-11-23 | End: 2020-11-23 | Stop reason: HOSPADM

## 2020-11-23 RX ORDER — LABETALOL HYDROCHLORIDE 5 MG/ML
5 INJECTION, SOLUTION INTRAVENOUS
Status: DISCONTINUED | OUTPATIENT
Start: 2020-11-23 | End: 2020-11-23 | Stop reason: HOSPADM

## 2020-11-23 RX ORDER — SODIUM CHLORIDE, SODIUM LACTATE, POTASSIUM CHLORIDE, CALCIUM CHLORIDE 600; 310; 30; 20 MG/100ML; MG/100ML; MG/100ML; MG/100ML
1000 INJECTION, SOLUTION INTRAVENOUS CONTINUOUS
Status: DISCONTINUED | OUTPATIENT
Start: 2020-11-23 | End: 2020-11-23 | Stop reason: HOSPADM

## 2020-11-23 RX ADMIN — Medication 160 MCG: at 09:39

## 2020-11-23 RX ADMIN — ONDANSETRON HYDROCHLORIDE 4 MG: 2 SOLUTION INTRAMUSCULAR; INTRAVENOUS at 09:31

## 2020-11-23 RX ADMIN — FENTANYL CITRATE 50 MCG: 50 INJECTION INTRAMUSCULAR; INTRAVENOUS at 09:28

## 2020-11-23 RX ADMIN — Medication 160 MCG: at 09:33

## 2020-11-23 RX ADMIN — FENTANYL CITRATE 50 MCG: 50 INJECTION INTRAMUSCULAR; INTRAVENOUS at 09:47

## 2020-11-23 RX ADMIN — PROPOFOL 150 MG: 10 INJECTION, EMULSION INTRAVENOUS at 09:15

## 2020-11-23 RX ADMIN — DEXAMETHASONE SODIUM PHOSPHATE 4 MG: 4 INJECTION, SOLUTION INTRAMUSCULAR; INTRAVENOUS at 09:31

## 2020-11-23 RX ADMIN — SODIUM CHLORIDE, POTASSIUM CHLORIDE, SODIUM LACTATE AND CALCIUM CHLORIDE 1000 ML: 600; 310; 30; 20 INJECTION, SOLUTION INTRAVENOUS at 07:19

## 2020-11-23 RX ADMIN — CEFAZOLIN 2 G: 1 INJECTION, POWDER, FOR SOLUTION INTRAVENOUS at 09:17

## 2020-11-23 RX ADMIN — FENTANYL CITRATE 150 MCG: 50 INJECTION INTRAMUSCULAR; INTRAVENOUS at 09:15

## 2020-11-23 RX ADMIN — LIDOCAINE HYDROCHLORIDE 100 MG: 20 INJECTION, SOLUTION INTRAVENOUS at 09:15

## 2020-11-23 RX ADMIN — SODIUM CHLORIDE, POTASSIUM CHLORIDE, SODIUM LACTATE AND CALCIUM CHLORIDE 1000 ML: 600; 310; 30; 20 INJECTION, SOLUTION INTRAVENOUS at 10:26

## 2020-11-23 NOTE — ANESTHESIA POSTPROCEDURE EVALUATION
Patient: Austin Beltran    Procedure Summary     Date: 11/23/20 Room / Location:  [facility] OR  /  [facility]    Anesthesia Start: 0912 Anesthesia Stop: 0954    Procedure: CYSTOSCOPY LASER LITHOTRIPSY AND REMOVAL BLADDER STONES (N/A Ureter) Diagnosis:       Bladder calculus      (Bladder calculus [N21.0])    Surgeon: Иван Mejia MD Provider: Nicanor Rodgers CRNA    Anesthesia Type: general ASA Status: 2          Anesthesia Type: general    Vitals  Vitals Value Taken Time   /95 11/23/20 1030   Temp 97.4 °F (36.3 °C) 11/23/20 1030   Pulse 80 11/23/20 1030   Resp 18 11/23/20 1030   SpO2 97 % 11/23/20 1030           Post Anesthesia Care and Evaluation    Patient location during evaluation: PACU  Patient participation: complete - patient participated  Level of consciousness: awake and alert  Pain management: adequate  Airway patency: patent  Anesthetic complications: No anesthetic complications    Cardiovascular status: acceptable  Respiratory status: acceptable  Hydration status: acceptable    Comments: Blood pressure 136/95, pulse 80, temperature 97.4 °F (36.3 °C), temperature source Temporal, resp. rate 18, SpO2 97 %.    Pt discharged from PACU based on maia score >8  No anesthesia care post op

## 2020-11-23 NOTE — OP NOTE
Operative Summary    Austin Beltran  Date of Procedure: 11/23/2020    Pre-op Diagnosis:   Bladder calculus [N21.0]    Post-op Diagnosis:     Post-Op Diagnosis Codes:     * Bladder calculus [N21.0]  BPH with bladder outlet obstruction    Procedure/CPT® Codes:      Procedure(s):  CYSTOSCOPY LASER LITHOTRIPSY AND REMOVAL BLADDER STONES    Surgeon(s):  Иван Mejia MD    Anesthesia: General    Staff:   Circulator: Bere Velasco RN  Scrub Person: Marc Palmer; Liseth Malone    Indications for procedure:  Patient without obstructive symptoms and multiple bladder calculi.  About 6 months ago had bladder calculi removed as well as a distal ureteral stone.    Findings:   Cystoscopy: The anterior urethra is without evidence of stricture or obstruction.  The prostatic urethra shows that there is a significant lateral lobe enlargement very obstructive-looking process.  The bladder neck is elevated.  I can see if you prostatic calculi that are partly exposed in the urethra.  There are multiple bladder stones present.  The bladder is heavily trabeculated there is a few scattered diverticuli noted.  The orifices orthotopic in their position normal in their configuration.    Procedure details:  After appropriate anesthesia, positioning, prep and drape, timeout protocol was observed.     22 Estonian cystoscope sheath with 30 degree lens was passed.  I did a 37 degree lens inspection of the bladder as described above.    Using a 26 resectoscope sheath and a laser bridge on the Stortz ureteroscope I was able to fragment the stones using the Anthony laser.  Using the contact mode with settings of 1.2 J at a repetition rate of 15 as well as dusting settings of 0.3 J repetition rate of 80 the stones were fragmented easily and I was able to remove all the fragments with an [employer]st evacuator.  The stones were sent for analysis    22 Estonian three-way Holland catheter is then placed in 10 to remove it prior to  discharge.    Estimated Blood Loss: Less than 30 mL    Specimens:                Specimens     ID Source Type Tests Collected By Collected At Frozen?      A Urinary Bladder Calculus · TISSUE PATHOLOGY EXAM   Иван Mejia MD 11/23/20 0802      Description: BLADDER STONE            Drains: 22 Pashto three-way Holland catheter    Complications: none    Plan: Patient needs to undergo TURP to relieve his outlet obstruction which is led to failure to empty and recurrent bladder calculi.      (Please note that portions of this note were completed with a voice recognition program.)  Иван Mejia MD     Date: 11/23/2020  Time: 10:05 CST

## 2020-11-23 NOTE — ANESTHESIA PREPROCEDURE EVALUATION
Anesthesia Evaluation     Patient summary reviewed   no history of anesthetic complications:  NPO Solid Status: > 8 hours             Airway   Mallampati: II  TM distance: >3 FB  Neck ROM: full  Dental      Pulmonary    (-) COPD, asthma, sleep apnea, not a smoker  Cardiovascular   Exercise tolerance: excellent (>7 METS)    (-) pacemaker, past MI, angina, cardiac stents      Neuro/Psych  (-) seizures, TIA, CVA  GI/Hepatic/Renal/Endo    (+) obesity,     (-) GERD, liver disease, no renal disease, diabetes    Musculoskeletal     Abdominal    Substance History      OB/GYN          Other                        Anesthesia Plan    ASA 2     general     intravenous induction     Anesthetic plan, all risks, benefits, and alternatives have been provided, discussed and informed consent has been obtained with: patient.

## 2020-11-23 NOTE — DISCHARGE INSTRUCTIONS

## 2020-11-23 NOTE — ANESTHESIA PROCEDURE NOTES
Airway  Urgency: elective    Date/Time: 11/23/2020 9:16 AM  Airway not difficult    General Information and Staff    Patient location during procedure: OR    Indications and Patient Condition  Indications for airway management: airway protection    Preoxygenated: yes  MILS maintained throughout  Mask difficulty assessment: 1 - vent by mask    Final Airway Details  Final airway type: supraglottic airway      Successful airway: classic  Size 4    Number of attempts at approach: 1  Assessment: lips, teeth, and gum same as pre-op and atraumatic intubation

## 2020-12-01 ENCOUNTER — TELEPHONE (OUTPATIENT)
Dept: UROLOGY | Facility: CLINIC | Age: 65
End: 2020-12-01

## 2020-12-01 NOTE — TELEPHONE ENCOUNTER
PT CALLED, HE WAS TAKING A MED THAT DR ROACH WANTED HIM TO CONTINUE TAKING, WAS TO HAVE A VID CALL TODAY. HE IS OUT OF NITROFURANTOIN 50 MILL GRAM CAPSULES. IF HE IS TO KEEP TAKING HE NEEDS A REFILL. SURGERY WAS  NOV 23RD

## 2020-12-31 LAB
LAB AP CASE REPORT: NORMAL
LAB AP DIAGNOSIS COMMENT: NORMAL
PATH REPORT.FINAL DX SPEC: NORMAL
PATH REPORT.GROSS SPEC: NORMAL

## 2023-11-03 ENCOUNTER — HOSPITAL ENCOUNTER (EMERGENCY)
Age: 68
Discharge: HOME | End: 2023-11-03
Attending: EMERGENCY MEDICINE
Payer: MEDICARE

## 2023-11-03 ENCOUNTER — APPOINTMENT (OUTPATIENT)
Dept: GENERAL RADIOLOGY | Age: 68
End: 2023-11-03
Payer: MEDICARE

## 2023-11-03 ENCOUNTER — APPOINTMENT (OUTPATIENT)
Dept: CT IMAGING | Age: 68
End: 2023-11-03
Payer: MEDICARE

## 2023-11-03 VITALS
HEART RATE: 98 BPM | DIASTOLIC BLOOD PRESSURE: 111 MMHG | HEIGHT: 67 IN | BODY MASS INDEX: 29.82 KG/M2 | WEIGHT: 190 LBS | TEMPERATURE: 99 F | RESPIRATION RATE: 18 BRPM | SYSTOLIC BLOOD PRESSURE: 167 MMHG | OXYGEN SATURATION: 99 %

## 2023-11-03 DIAGNOSIS — S22.42XA CLOSED FRACTURE OF MULTIPLE RIBS OF LEFT SIDE, INITIAL ENCOUNTER: ICD-10-CM

## 2023-11-03 DIAGNOSIS — S42.032A CLOSED DISPLACED FRACTURE OF ACROMIAL END OF LEFT CLAVICLE, INITIAL ENCOUNTER: ICD-10-CM
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
## 2023-11-03 PROCEDURE — 99284 EMERGENCY DEPT VISIT MOD MDM: CPT

## 2023-11-03 PROCEDURE — 70450 CT HEAD/BRAIN W/O DYE: CPT

## 2023-11-03 PROCEDURE — 71250 CT THORAX DX C-: CPT

## 2023-11-03 PROCEDURE — 72125 CT NECK SPINE W/O DYE: CPT

## 2023-11-03 PROCEDURE — 73030 X-RAY EXAM OF SHOULDER: CPT

## 2023-11-03 PROCEDURE — 6370000000 HC RX 637 (ALT 250 FOR IP): Performed by: EMERGENCY MEDICINE

## 2023-11-03 RX ORDER — TIZANIDINE 2 MG/1
2-4 TABLET ORAL EVERY 8 HOURS PRN
Qty: 30 TABLET | Refills: 0 | Status: SHIPPED | OUTPATIENT
Start: 2023-11-03

## 2023-11-03 RX ORDER — NAPROXEN 500 MG/1
500 TABLET ORAL 2 TIMES DAILY WITH MEALS
Qty: 14 TABLET | Refills: 0 | Status: SHIPPED | OUTPATIENT
Start: 2023-11-03 | End: 2023-11-10

## 2023-11-03 RX ORDER — LIDOCAINE 50 MG/G
1 PATCH TOPICAL DAILY
Qty: 30 PATCH | Refills: 0 | Status: SHIPPED | OUTPATIENT
Start: 2023-11-03

## 2023-11-03 RX ORDER — OXYCODONE HYDROCHLORIDE AND ACETAMINOPHEN 5; 325 MG/1; MG/1
1 TABLET ORAL EVERY 6 HOURS PRN
Qty: 12 TABLET | Refills: 0 | Status: SHIPPED | OUTPATIENT
Start: 2023-11-03 | End: 2023-11-06

## 2023-11-03 RX ORDER — OXYCODONE HYDROCHLORIDE AND ACETAMINOPHEN 5; 325 MG/1; MG/1
1 TABLET ORAL ONCE
Status: COMPLETED | OUTPATIENT
Start: 2023-11-03 | End: 2023-11-03

## 2023-11-03 RX ORDER — TAMSULOSIN HYDROCHLORIDE 0.4 MG/1
0.4 CAPSULE ORAL DAILY
COMMUNITY

## 2023-11-03 RX ADMIN — OXYCODONE AND ACETAMINOPHEN 1 TABLET: 5; 325 TABLET ORAL at 16:45

## 2023-11-03 ASSESSMENT — ENCOUNTER SYMPTOMS
GASTROINTESTINAL NEGATIVE: 1
RESPIRATORY NEGATIVE: 1
EYES NEGATIVE: 1

## 2023-11-03 ASSESSMENT — PAIN SCALES - GENERAL: PAINLEVEL_OUTOF10: 8

## 2023-11-03 NOTE — ED PROVIDER NOTES
[facility] EMERGENCY DEPT  EMERGENCY DEPARTMENT ENCOUNTER      Pt Name: Elda Valdivia  MRN: [id number]  [address] [facility], [place], operated by [facility] 1955  Date of evaluation: 11/3/2023  Provider: Rosangela Choi MD    CHIEF COMPLAINT       Chief Complaint   Patient presents with    Fall     Fall from 4 feet high off of trailer, landed on left side, hit head, no LOC    Shoulder Injury     Left shoulder pain from fall         HISTORY OF PRESENT ILLNESS   (Location/Symptom, Timing/Onset,Context/Setting, Quality, Duration, Modifying Factors, Severity)  Note limiting factors. Elda Valdivia is a 77 y.o. 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 Patient landed on his left shoulder and hit the left side of his head. No loss of consciousness. Pain over left anterior and superior shoulder with decreased range of motion. No back pain. Has some intermittent pain to the left chest wall. No shortness of breath. No abdominal pain. Has been ambulatory without difficulty without pain in the hips or lower extremities. 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 Regi Reed with orthopedic surgery    HPI    NursingNotes were reviewed. REVIEW OF SYSTEMS    (2-9 systems for level 4, 10 or more for level 5)     Review of Systems   Constitutional: Negative. HENT: Negative. Eyes: Negative. Respiratory: Negative. Cardiovascular: Negative. Gastrointestinal: Negative. Genitourinary: Negative. Musculoskeletal:  Positive for arthralgias. Skin: Negative. Neurological: Negative. Hematological: Negative. Psychiatric/Behavioral: Negative. A complete review of systems was performed and is negative except as noted above in the HPI. PAST MEDICAL HISTORY     Past Medical History:   Diagnosis Date    Enlarged prostate          SURGICAL HISTORY     History reviewed.  No

## 2024-05-21 ENCOUNTER — APPOINTMENT (OUTPATIENT)
Dept: GENERAL RADIOLOGY | Facility: HOSPITAL | Age: 69
DRG: 066 | End: 2024-05-21
Payer: MEDICARE

## 2024-05-21 ENCOUNTER — APPOINTMENT (OUTPATIENT)
Dept: CT IMAGING | Facility: HOSPITAL | Age: 69
DRG: 066 | End: 2024-05-21
Payer: MEDICARE

## 2024-05-21 ENCOUNTER — HOSPITAL ENCOUNTER (EMERGENCY)
Facility: HOSPITAL | Age: 69
Discharge: HOME | DRG: 066 | End: 2024-05-21
Attending: STUDENT IN AN ORGANIZED HEALTH CARE EDUCATION/TRAINING PROGRAM | Admitting: STUDENT IN AN ORGANIZED HEALTH CARE EDUCATION/TRAINING PROGRAM
Payer: MEDICARE

## 2024-05-21 VITALS
HEART RATE: 83 BPM | DIASTOLIC BLOOD PRESSURE: 108 MMHG | WEIGHT: 194 LBS | BODY MASS INDEX: 30.45 KG/M2 | TEMPERATURE: 98.2 F | OXYGEN SATURATION: 99 % | SYSTOLIC BLOOD PRESSURE: 150 MMHG | RESPIRATION RATE: 19 BRPM | HEIGHT: 67 IN

## 2024-05-21 DIAGNOSIS — R55 SYNCOPE, UNSPECIFIED SYNCOPE TYPE: Primary | ICD-10-CM

## 2024-05-21 LAB
ALBUMIN SERPL-MCNC: 4.5 G/DL (ref 3.5–5.2)
ALBUMIN/GLOB SERPL: 1.6 G/DL
ALP SERPL-CCNC: 69 U/L (ref 39–117)
ALT SERPL W P-5'-P-CCNC: 13 U/L (ref 1–41)
ANION GAP SERPL CALCULATED.3IONS-SCNC: 6 MMOL/L (ref 5–15)
AST SERPL-CCNC: 15 U/L (ref 1–40)
BASOPHILS # BLD AUTO: 0.05 10*3/MM3 (ref 0–0.2)
BASOPHILS NFR BLD AUTO: 0.7 % (ref 0–1.5)
BILIRUB SERPL-MCNC: 0.3 MG/DL (ref 0–1.2)
BUN SERPL-MCNC: 15 MG/DL (ref 8–23)
BUN/CREAT SERPL: 16.3 (ref 7–25)
CALCIUM SPEC-SCNC: 11.2 MG/DL (ref 8.6–10.5)
CHLORIDE SERPL-SCNC: 105 MMOL/L (ref 98–107)
CO2 SERPL-SCNC: 29 MMOL/L (ref 22–29)
CREAT SERPL-MCNC: 0.92 MG/DL (ref 0.76–1.27)
D DIMER PPP FEU-MCNC: 0.28 MCGFEU/ML (ref 0–0.71)
DEPRECATED RDW RBC AUTO: 42.6 FL (ref 37–54)
EGFRCR SERPLBLD CKD-EPI 2021: 88.9 ML/MIN/1.73
EOSINOPHIL # BLD AUTO: 0 10*3/MM3 (ref 0–0.4)
EOSINOPHIL NFR BLD AUTO: 0 % (ref 0.3–6.2)
ERYTHROCYTE [DISTWIDTH] IN BLOOD BY AUTOMATED COUNT: 13 % (ref 12.3–15.4)
GEN 5 2HR TROPONIN T REFLEX: 8 NG/L
GLOBULIN UR ELPH-MCNC: 2.9 GM/DL
GLUCOSE SERPL-MCNC: 108 MG/DL (ref 65–99)
HCT VFR BLD AUTO: 44.7 % (ref 37.5–51)
HGB BLD-MCNC: 14.6 G/DL (ref 13–17.7)
IMM GRANULOCYTES # BLD AUTO: 0.03 10*3/MM3 (ref 0–0.05)
IMM GRANULOCYTES NFR BLD AUTO: 0.4 % (ref 0–0.5)
LYMPHOCYTES # BLD AUTO: 1.12 10*3/MM3 (ref 0.7–3.1)
LYMPHOCYTES NFR BLD AUTO: 16.3 % (ref 19.6–45.3)
MAGNESIUM SERPL-MCNC: 2.2 MG/DL (ref 1.6–2.4)
MCH RBC QN AUTO: 29.4 PG (ref 26.6–33)
MCHC RBC AUTO-ENTMCNC: 32.7 G/DL (ref 31.5–35.7)
MCV RBC AUTO: 89.9 FL (ref 79–97)
MONOCYTES # BLD AUTO: 0.28 10*3/MM3 (ref 0.1–0.9)
MONOCYTES NFR BLD AUTO: 4.1 % (ref 5–12)
NEUTROPHILS NFR BLD AUTO: 5.39 10*3/MM3 (ref 1.7–7)
NEUTROPHILS NFR BLD AUTO: 78.5 % (ref 42.7–76)
NRBC BLD AUTO-RTO: 0 /100 WBC (ref 0–0.2)
PLATELET # BLD AUTO: 249 10*3/MM3 (ref 140–450)
PMV BLD AUTO: 9.7 FL (ref 6–12)
POTASSIUM SERPL-SCNC: 4.5 MMOL/L (ref 3.5–5.2)
PROT SERPL-MCNC: 7.4 G/DL (ref 6–8.5)
RBC # BLD AUTO: 4.97 10*6/MM3 (ref 4.14–5.8)
SODIUM SERPL-SCNC: 140 MMOL/L (ref 136–145)
TROPONIN T DELTA: -1 NG/L
TROPONIN T SERPL HS-MCNC: 9 NG/L
WBC NRBC COR # BLD AUTO: 6.87 10*3/MM3 (ref 3.4–10.8)

## 2024-05-21 PROCEDURE — 93005 ELECTROCARDIOGRAM TRACING: CPT | Performed by: STUDENT IN AN ORGANIZED HEALTH CARE EDUCATION/TRAINING PROGRAM

## 2024-05-21 PROCEDURE — 83735 ASSAY OF MAGNESIUM: CPT | Performed by: STUDENT IN AN ORGANIZED HEALTH CARE EDUCATION/TRAINING PROGRAM

## 2024-05-21 PROCEDURE — 80053 COMPREHEN METABOLIC PANEL: CPT | Performed by: STUDENT IN AN ORGANIZED HEALTH CARE EDUCATION/TRAINING PROGRAM

## 2024-05-21 PROCEDURE — 84484 ASSAY OF TROPONIN QUANT: CPT | Performed by: STUDENT IN AN ORGANIZED HEALTH CARE EDUCATION/TRAINING PROGRAM

## 2024-05-21 PROCEDURE — 93010 ELECTROCARDIOGRAM REPORT: CPT | Performed by: INTERNAL MEDICINE

## 2024-05-21 PROCEDURE — 99284 EMERGENCY DEPT VISIT MOD MDM: CPT

## 2024-05-21 PROCEDURE — 36415 COLL VENOUS BLD VENIPUNCTURE: CPT

## 2024-05-21 PROCEDURE — 85025 COMPLETE CBC W/AUTO DIFF WBC: CPT | Performed by: STUDENT IN AN ORGANIZED HEALTH CARE EDUCATION/TRAINING PROGRAM

## 2024-05-21 PROCEDURE — 71045 X-RAY EXAM CHEST 1 VIEW: CPT

## 2024-05-21 PROCEDURE — 70486 CT MAXILLOFACIAL W/O DYE: CPT

## 2024-05-21 PROCEDURE — 70450 CT HEAD/BRAIN W/O DYE: CPT

## 2024-05-21 PROCEDURE — 72125 CT NECK SPINE W/O DYE: CPT

## 2024-05-21 PROCEDURE — 85379 FIBRIN DEGRADATION QUANT: CPT | Performed by: STUDENT IN AN ORGANIZED HEALTH CARE EDUCATION/TRAINING PROGRAM

## 2024-05-21 PROCEDURE — 25810000003 LACTATED RINGERS SOLUTION: Performed by: STUDENT IN AN ORGANIZED HEALTH CARE EDUCATION/TRAINING PROGRAM

## 2024-05-21 RX ADMIN — SODIUM CHLORIDE, POTASSIUM CHLORIDE, SODIUM LACTATE AND CALCIUM CHLORIDE 1000 ML: 600; 310; 30; 20 INJECTION, SOLUTION INTRAVENOUS at 14:56

## 2024-05-21 NOTE — DISCHARGE INSTRUCTIONS
Please follow-up with your doctor as soon as possible about getting an ultrasound of your heart called an echocardiogram to investigate the passing out episode further.    Come back for passing out episodes, chest pain, if you have severe shortness of breath, or for any other emergent concerns. Otherwise please see your primary care doctor as soon as possible for repeat evaluation and consideration of more testing.

## 2024-05-21 NOTE — ED PROVIDER NOTES
Subjective   History of Present Illness  Patient presents due to syncope fall head injury and numbness.  This morning he woke up and felt nauseous.  He went to the bathroom and had a bowel movement.  When he turned to grab toilet paper he passed out.  Hit the left side of his head on the counter, woke up underneath the counter.  Duration was short.  In association with the syncope he did not have any chest pain shortness of breath palpitations or vomiting.  Denies vertiginous dizziness.  He has had 2 more episodes where he felt lightheaded since then.  This occurred around 5 AM this morning.  His lightheadedness episodes do not associate with chest pain shortness of breath palpitations or vomiting or vertiginous dizziness.  No history of blood clot.  No history of heart disease.    Whenever he awakened from the syncope episode, his left arm felt numb and his four fingers feel tingly.  He did injure his clavicle a couple months ago which restricts his range of motion of his left shoulder.  He does not hurt in his clavicle or shoulder or arm.  His range of motion is the same.  No weakness. No headache. No visual disturbance or dizziness.     Review of Systems   Constitutional:  Negative for chills and fever.   Respiratory:  Negative for cough and shortness of breath.    Cardiovascular:  Negative for chest pain and palpitations.   Gastrointestinal:  Negative for abdominal pain and vomiting.   Genitourinary:  Negative for difficulty urinating and dysuria.   Neurological:  Positive for syncope and numbness. Negative for light-headedness.       Past Medical History:   Diagnosis Date    Arthritis     Bladder stones     Kidney stones     Urinary incontinence        No Known Allergies    Past Surgical History:   Procedure Laterality Date    CYSTOSCOPY BLADDER STONE LITHOTRIPSY N/A 11/23/2020    Procedure: CYSTOSCOPY LASER LITHOTRIPSY AND REMOVAL BLADDER STONES;  Surgeon: Иван Mejia MD;  Location: [facility] OR;  Service:  Urology;  Laterality: N/A;    INGUINAL HERNIA REPAIR  1990    KIDNEY STONE SURGERY      X2 SURGERIES    LUMBAR DISCECTOMY  2000       Family History   Problem Relation Age of Onset    Sudden death Neg Hx     Urolithiasis Neg Hx        Social History     Socioeconomic History    Marital status:    Tobacco Use    Smoking status: Never    Smokeless tobacco: Never   Vaping Use    Vaping status: Never Used   Substance and Sexual Activity    Alcohol use: Yes     Comment: occasional    Drug use: Never    Sexual activity: Defer           Objective   Physical Exam  Vitals reviewed.   Constitutional:       General: He is not in acute distress.  HENT:      Head: Normocephalic and atraumatic.   Eyes:      Extraocular Movements: Extraocular movements intact.      Conjunctiva/sclera: Conjunctivae normal.   Cardiovascular:      Pulses: Normal pulses.      Heart sounds: Normal heart sounds.   Pulmonary:      Effort: Pulmonary effort is normal. No respiratory distress.   Abdominal:      General: Abdomen is flat. There is no distension.   Musculoskeletal:      Cervical back: Normal range of motion and neck supple.      Comments: Scalp atraumatic.   Forehead atraumatic, stable to palpation, nontender.   Abrasion to nose no open lacerations denies ttp teeth are stable.  No intraoral injuries noted.   No otorrhea.   Trachea midline, breath sounds were noted bilaterally. PERRL.    Cervical spine: no midline tenderness to palpation. No paraspinal tenderness to palpation.  Thoracic spine: no midline tenderness to palpation. No paraspinal tenderness to palpation.  Lumbar spine: no midline tenderness to palpation. No paraspinal tenderness to palpation.  Spine stable with no palpable deformity or step-off    Clavicles stable and nontender to AP and lateral compression.  Chest stable and nontender to AP and lateral compression.  Pelvis stable and nontender to lateral compression.    Extremities are warm, well-perfused with capillary  refill intact and no gross motor deficits.   Skin:     General: Skin is warm and dry.   Neurological:      General: No focal deficit present.      Mental Status: He is alert. Mental status is at baseline.      Comments: Right upper extremity: 5/5 strength with handgrip and flexion/extension of shoulders, elbows.   Light touch sensation intact and equal when compared to the left upper extremity.    Left upper extremity: 5/5 strength with handgrip and flexion/extension of shoulders, elbows.   Light touch sensation reported diminished when compared to the right upper extremity.    Right lower extremity: 5/5 strength with flexion/extension of hips, knees, and dorsi/plantarflexion of ankles. Able to wiggle toes.   Light touch sensation intact and equal when compared to the left lower extremity.    Left lower extremity: 5/5 strength with flexion/extension of hips, knees, and dorsi/plantarflexion of ankles. Able to wiggle toes.   Light touch sensation intact and equal when compared to the right lower extremity.    Light sensation intact in bilateral face. CN 2-12 normal. FNF normal. Heel-shin normal, gets month and age right, squeezes hands blinks eyes, speech is clear and fluent. No peripheral field cuts   Psychiatric:         Behavior: Behavior normal.         Thought Content: Thought content normal.         Procedures           ED Course  ED Course as of 05/21/24 2342   Tue May 21, 2024   1151 NIHSS 1 [AS]   1228 ECG with early repol vs ANITA in V1/V2, prominent T waves, potassium and Tn pending, pt with no CP, no h/o CAD, will d/w cardiologist. ECG was just obtained and brought to me. [AS]   1241 Dr. King reviewed ECG; no STEMI [AS]   1323 Acromion changes are not acute on XR [AS]   1324 [place] syncope risk score 0 [AS]   1349 Updated pt. Still somewhat lightheaded with standing. Will obtain repeat Tn, do orthostatics try PO. [AS]   1455 Pt reports left arm tingling is essentially gone. 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 Will re-evaluate; suspect pt can be dc close outpt f/u with plan for ECHO.  [AS]      ED Course User Index  [AS] Farhan Diallo MD                          Total (NIH Stroke Scale): 1                  Medical Decision Making  Problems Addressed:  Syncope, unspecified syncope type: complicated acute illness or injury    Amount and/or Complexity of Data Reviewed  Labs: ordered.  Radiology: ordered.  ECG/medicine tests: ordered.      Austin Beltran is a 69 y.o. male with PMH above who presents to the Emergency Department with syncope.  Valvular disease less likely given the absence of a murmur.  Pulmonary close less likely given the absence of a history of DVT or leg pain or swelling.  ACS is less likely in the absence of chest pain.  Critical arrhythmia less likely in the absence of palpitations.  Will conduct evaluation for [place] syncope risk score for risk and had a stroke scale is 1 in the left arm  stratification.  No chest pain to suggest ACS. NIHSS 1 Due to diminished sensation isolated and this is after syncope. Ischemic infarct less likely, consider hemorrhagic process with syncope, or minor arm injury; no signs of fx/dislocation on exam.        ED Course:   -patient ate, drank, ambulated. Remained normotensive/slightly hypertensive. See ED course. No injuries on CT. Tn's negative. Gave clear instructions to f/u ASAP for echo No further workup indicated at this time. Patient is stable and appropriate for discharge. Patient received strict return precautions per discharge instructions and was instructed to follow-up with their primary care provider regarding their ER visit.      Final diagnosis: syncope    All questions answered.  Patient/family was understanding and in agreement with today's assessment and plan. The patient was monitored during their stay in the ED and dispositioned without acute event.    Electronically signed by:  Farhan Diallo MD 5/21/2024 23:42 CDT      Note: Dragon medical dictation software was used in the creation of this note.    Final diagnoses:   Syncope, unspecified syncope type       ED Disposition  ED Disposition       ED Disposition   Discharge    Condition   Stable    Comment   --               Sean Shaffer, DO  [address] [facility] [id number] [phone]               Medication List      No changes were made to your prescriptions during this visit.            Farhan Diallo MD  05/21/24 6071

## 2024-05-22 LAB
QT INTERVAL: 362 MS
QTC INTERVAL: 420 MS

## 2024-05-24 ENCOUNTER — HOSPITAL ENCOUNTER (INPATIENT)
Facility: HOSPITAL | Age: 69
LOS: 2 days | Discharge: HOME | DRG: 066 | End: 2024-05-26
Attending: STUDENT IN AN ORGANIZED HEALTH CARE EDUCATION/TRAINING PROGRAM | Admitting: INTERNAL MEDICINE
Payer: MEDICARE

## 2024-05-24 DIAGNOSIS — R55 NEAR SYNCOPE: ICD-10-CM

## 2024-05-24 DIAGNOSIS — I63.9 CEREBROVASCULAR ACCIDENT (CVA), UNSPECIFIED MECHANISM: Primary | ICD-10-CM

## 2024-05-24 LAB
ANION GAP SERPL CALCULATED.3IONS-SCNC: 6 MMOL/L (ref 5–15)
BASOPHILS # BLD AUTO: 0.06 10*3/MM3 (ref 0–0.2)
BASOPHILS NFR BLD AUTO: 0.8 % (ref 0–1.5)
BUN SERPL-MCNC: 12 MG/DL (ref 8–23)
BUN/CREAT SERPL: 11.9 (ref 7–25)
CALCIUM SPEC-SCNC: 11.1 MG/DL (ref 8.6–10.5)
CHLORIDE SERPL-SCNC: 104 MMOL/L (ref 98–107)
CO2 SERPL-SCNC: 31 MMOL/L (ref 22–29)
CREAT SERPL-MCNC: 1.01 MG/DL (ref 0.76–1.27)
DEPRECATED RDW RBC AUTO: 42.5 FL (ref 37–54)
EGFRCR SERPLBLD CKD-EPI 2021: 79.5 ML/MIN/1.73
EOSINOPHIL # BLD AUTO: 0.13 10*3/MM3 (ref 0–0.4)
EOSINOPHIL NFR BLD AUTO: 1.7 % (ref 0.3–6.2)
ERYTHROCYTE [DISTWIDTH] IN BLOOD BY AUTOMATED COUNT: 13 % (ref 12.3–15.4)
GLUCOSE BLDC GLUCOMTR-MCNC: 112 MG/DL (ref 70–130)
GLUCOSE SERPL-MCNC: 96 MG/DL (ref 65–99)
HCT VFR BLD AUTO: 43.7 % (ref 37.5–51)
HGB BLD-MCNC: 14.7 G/DL (ref 13–17.7)
IMM GRANULOCYTES # BLD AUTO: 0.02 10*3/MM3 (ref 0–0.05)
IMM GRANULOCYTES NFR BLD AUTO: 0.3 % (ref 0–0.5)
LYMPHOCYTES # BLD AUTO: 2.16 10*3/MM3 (ref 0.7–3.1)
LYMPHOCYTES NFR BLD AUTO: 28.3 % (ref 19.6–45.3)
MAGNESIUM SERPL-MCNC: 2.3 MG/DL (ref 1.6–2.4)
MCH RBC QN AUTO: 29.9 PG (ref 26.6–33)
MCHC RBC AUTO-ENTMCNC: 33.6 G/DL (ref 31.5–35.7)
MCV RBC AUTO: 89 FL (ref 79–97)
MONOCYTES # BLD AUTO: 0.62 10*3/MM3 (ref 0.1–0.9)
MONOCYTES NFR BLD AUTO: 8.1 % (ref 5–12)
NEUTROPHILS NFR BLD AUTO: 4.63 10*3/MM3 (ref 1.7–7)
NEUTROPHILS NFR BLD AUTO: 60.8 % (ref 42.7–76)
NRBC BLD AUTO-RTO: 0 /100 WBC (ref 0–0.2)
PLATELET # BLD AUTO: 239 10*3/MM3 (ref 140–450)
PMV BLD AUTO: 9.7 FL (ref 6–12)
POTASSIUM SERPL-SCNC: 4.1 MMOL/L (ref 3.5–5.2)
PTH-INTACT SERPL-MCNC: 116.3 PG/ML (ref 15–65)
RBC # BLD AUTO: 4.91 10*6/MM3 (ref 4.14–5.8)
SODIUM SERPL-SCNC: 141 MMOL/L (ref 136–145)
WBC NRBC COR # BLD AUTO: 7.62 10*3/MM3 (ref 3.4–10.8)

## 2024-05-24 PROCEDURE — 83735 ASSAY OF MAGNESIUM: CPT | Performed by: STUDENT IN AN ORGANIZED HEALTH CARE EDUCATION/TRAINING PROGRAM

## 2024-05-24 PROCEDURE — 93010 ELECTROCARDIOGRAM REPORT: CPT | Performed by: INTERNAL MEDICINE

## 2024-05-24 PROCEDURE — 82948 REAGENT STRIP/BLOOD GLUCOSE: CPT

## 2024-05-24 PROCEDURE — 80048 BASIC METABOLIC PNL TOTAL CA: CPT | Performed by: STUDENT IN AN ORGANIZED HEALTH CARE EDUCATION/TRAINING PROGRAM

## 2024-05-24 PROCEDURE — 85025 COMPLETE CBC W/AUTO DIFF WBC: CPT | Performed by: STUDENT IN AN ORGANIZED HEALTH CARE EDUCATION/TRAINING PROGRAM

## 2024-05-24 PROCEDURE — 83970 ASSAY OF PARATHORMONE: CPT | Performed by: INTERNAL MEDICINE

## 2024-05-24 PROCEDURE — 99285 EMERGENCY DEPT VISIT HI MDM: CPT

## 2024-05-24 PROCEDURE — 93005 ELECTROCARDIOGRAM TRACING: CPT | Performed by: STUDENT IN AN ORGANIZED HEALTH CARE EDUCATION/TRAINING PROGRAM

## 2024-05-24 RX ORDER — SODIUM CHLORIDE 9 MG/ML
40 INJECTION, SOLUTION INTRAVENOUS AS NEEDED
Status: DISCONTINUED | OUTPATIENT
Start: 2024-05-24 | End: 2024-05-26 | Stop reason: HOSPADM

## 2024-05-24 RX ORDER — SODIUM CHLORIDE 0.9 % (FLUSH) 0.9 %
10 SYRINGE (ML) INJECTION AS NEEDED
Status: DISCONTINUED | OUTPATIENT
Start: 2024-05-24 | End: 2024-05-26 | Stop reason: HOSPADM

## 2024-05-24 RX ORDER — TAMSULOSIN HYDROCHLORIDE 0.4 MG/1
0.4 CAPSULE ORAL DAILY
Status: DISCONTINUED | OUTPATIENT
Start: 2024-05-25 | End: 2024-05-25 | Stop reason: SDUPTHER

## 2024-05-24 RX ORDER — HYDROCODONE BITARTRATE AND ACETAMINOPHEN 5; 325 MG/1; MG/1
1 TABLET ORAL EVERY 8 HOURS PRN
Status: DISCONTINUED | OUTPATIENT
Start: 2024-05-24 | End: 2024-05-26 | Stop reason: HOSPADM

## 2024-05-24 RX ORDER — ASPIRIN 81 MG/1
81 TABLET, CHEWABLE ORAL DAILY
Status: DISCONTINUED | OUTPATIENT
Start: 2024-05-25 | End: 2024-05-26 | Stop reason: HOSPADM

## 2024-05-24 RX ORDER — ACETAMINOPHEN 325 MG/1
650 TABLET ORAL EVERY 6 HOURS PRN
Status: DISCONTINUED | OUTPATIENT
Start: 2024-05-24 | End: 2024-05-26 | Stop reason: HOSPADM

## 2024-05-24 RX ORDER — ONDANSETRON 2 MG/ML
4 INJECTION INTRAMUSCULAR; INTRAVENOUS EVERY 6 HOURS PRN
Status: DISCONTINUED | OUTPATIENT
Start: 2024-05-24 | End: 2024-05-26 | Stop reason: HOSPADM

## 2024-05-24 RX ORDER — SODIUM CHLORIDE 0.9 % (FLUSH) 0.9 %
10 SYRINGE (ML) INJECTION EVERY 12 HOURS SCHEDULED
Status: DISCONTINUED | OUTPATIENT
Start: 2024-05-24 | End: 2024-05-26 | Stop reason: HOSPADM

## 2024-05-24 RX ORDER — ASPIRIN 300 MG/1
300 SUPPOSITORY RECTAL DAILY
Status: DISCONTINUED | OUTPATIENT
Start: 2024-05-25 | End: 2024-05-26 | Stop reason: HOSPADM

## 2024-05-24 RX ORDER — FINASTERIDE 5 MG/1
5 TABLET, FILM COATED ORAL DAILY
Status: DISCONTINUED | OUTPATIENT
Start: 2024-05-25 | End: 2024-05-26 | Stop reason: HOSPADM

## 2024-05-24 RX ORDER — ATORVASTATIN CALCIUM 40 MG/1
80 TABLET, FILM COATED ORAL NIGHTLY
Status: DISCONTINUED | OUTPATIENT
Start: 2024-05-24 | End: 2024-05-26 | Stop reason: HOSPADM

## 2024-05-24 NOTE — ED PROVIDER NOTES
Subjective   History of Present Illness  Patient presents due to stroke.  He was seen a couple days ago after a syncopal episode.  He followed with his doctor because he had persistent pain in the back left side of his head and continue to have lightheadedness episodes.  He describes lightheadedness episodes as occurring randomly, usually improve with eating.  He feels foggy headed and weak.  He describes episode where he laid down and then felt the symptoms, try to get up to feel better, but did not feel better today.  Denies chest pain or shortness of breath.  Denies numbness or tingling or weakness.  He has felt like his gait has been off a few times while walking, and the symptoms are intermittent and then will get better.  He is not experiencing them now.  His doctor an MRI of his cervical spine and his brain and the report is shown by the wife; the MRI of the brain showed a possible acute to subacute punctate right cerebellar infarct.  No further syncopal episodes.  No left arm numbness or tingling.    Review of Systems   Constitutional:  Negative for chills and fever.   Respiratory:  Negative for cough and shortness of breath.    Cardiovascular:  Negative for chest pain.   Gastrointestinal:  Negative for abdominal pain and vomiting.   Neurological:  Positive for light-headedness. Negative for syncope.       Past Medical History:   Diagnosis Date    Arthritis     Bladder stones     Kidney stones     Urinary incontinence        Allergies   Allergen Reactions    Demerol [Meperidine] Hallucinations       Past Surgical History:   Procedure Laterality Date    CYSTOSCOPY BLADDER STONE LITHOTRIPSY N/A 11/23/2020    Procedure: CYSTOSCOPY LASER LITHOTRIPSY AND REMOVAL BLADDER STONES;  Surgeon: Иван Mejia MD;  Location: [facility];  Service: Urology;  Laterality: N/A;    INGUINAL HERNIA REPAIR  1990    KIDNEY STONE SURGERY      X2 SURGERIES    LUMBAR DISCECTOMY  2000       Family History   Problem Relation Age of  Onset    Sudden death Neg Hx     Urolithiasis Neg Hx        Social History     Socioeconomic History    Marital status:    Tobacco Use    Smoking status: Never    Smokeless tobacco: Never   Vaping Use    Vaping status: Never Used   Substance and Sexual Activity    Alcohol use: Yes     Comment: occasional    Drug use: Never    Sexual activity: Defer           Objective   Physical Exam  Vitals reviewed.   Constitutional:       General: He is not in acute distress.  HENT:      Head: Normocephalic and atraumatic.   Eyes:      Extraocular Movements: Extraocular movements intact.      Conjunctiva/sclera: Conjunctivae normal.      Pupils: Pupils are equal, round, and reactive to light.   Cardiovascular:      Pulses: Normal pulses.      Heart sounds: Normal heart sounds. No murmur heard.  Pulmonary:      Effort: Pulmonary effort is normal. No respiratory distress.      Breath sounds: Normal breath sounds. No wheezing.   Abdominal:      General: Abdomen is flat. There is no distension.      Tenderness: There is no abdominal tenderness.   Musculoskeletal:      Cervical back: Normal range of motion and neck supple.      Right lower leg: No edema.      Left lower leg: No edema.   Skin:     General: Skin is warm and dry.   Neurological:      General: No focal deficit present.      Mental Status: He is alert. Mental status is at baseline.      Comments: Right upper extremity: 5/5 strength with handgrip and flexion/extension of shoulders, elbows.   Light touch sensation intact and equal when compared to the left upper extremity.    Left upper extremity: 5/5 strength with handgrip and flexion/extension of shoulders, elbows.   Light touch sensation intact and equal when compared to the right upper extremity.    Right lower extremity: 5/5 strength with flexion/extension of hips, knees, and dorsi/plantarflexion of ankles. Able to wiggle toes.   Light touch sensation intact and equal when compared to the left lower  extremity.    Left lower extremity: 5/5 strength with flexion/extension of hips, knees, and dorsi/plantarflexion of ankles. Able to wiggle toes.   Light touch sensation intact and equal when compared to the right lower extremity.    Light sensation intact in bilateral face. CN 2-12 normal. FNF normal. Gait normal without ataxia or limp. Speech is fluent.   Psychiatric:         Behavior: Behavior normal.         Thought Content: Thought content normal.         Procedures           ED Course  ED Course as of 05/24/24 1923   Fri May 24, 2024   1810 EKG: sinus rhythm, no focal ischemic changes, no arrhythmia. [AS]      ED Course User Index  [AS] Farhan Diallo MD                          Total (NIH Stroke Scale): 0                  Medical Decision Making  Problems Addressed:  Cerebrovascular accident (CVA), unspecified mechanism: complicated acute illness or injury  Near syncope: complicated acute illness or injury    Amount and/or Complexity of Data Reviewed  Labs: ordered.  ECG/medicine tests: ordered.    Risk  Decision regarding hospitalization.      Austin Beltran is a 69 y.o. male with PMH above who presents to the Emergency Department with near syncope, stroke.  Does not have a correlating deficit for the punctate right cerebellar infarct, however this is a concerning finding in the context of syncopal event as well as near syncopal events with the previously documented neurologic deficit that he complained of a couple days ago.  He is can continued near syncope episodes, suspect he will need admission for echocardiogram, as well as stroke workup.  EKG reassuring in the ER, basic labs unremarkable.  No indication for further imaging or repeat test at this point.       ED Course:   -labs and ECG reassuring; plan for admission for further workup      Final diagnosis: stroke, near-syncope    All questions answered. Patient/family was understanding and in agreement with today's assessment and plan. The  patient was monitored during their stay in the ED and dispositioned without acute event.    Electronically signed by:  Farhan Diallo MD 5/24/2024 19:23 CDT      Note: Dragon medical dictation software was used in the creation of this note.      Final diagnoses:   Cerebrovascular accident (CVA), unspecified mechanism   Near syncope       ED Disposition  ED Disposition       ED Disposition   Decision to Admit    Condition   --    Comment   --               No follow-up provider specified.       Medication List      No changes were made to your prescriptions during this visit.            Farhan Diallo MD  05/24/24 5388

## 2024-05-25 ENCOUNTER — APPOINTMENT (OUTPATIENT)
Dept: CT IMAGING | Facility: HOSPITAL | Age: 69
DRG: 066 | End: 2024-05-25
Payer: MEDICARE

## 2024-05-25 ENCOUNTER — APPOINTMENT (OUTPATIENT)
Dept: CARDIOLOGY | Facility: HOSPITAL | Age: 69
DRG: 066 | End: 2024-05-25
Payer: MEDICARE

## 2024-05-25 PROBLEM — E78.5 HYPERLIPIDEMIA: Status: ACTIVE | Noted: 2024-05-25

## 2024-05-25 PROBLEM — R03.0 ELEVATED BLOOD-PRESSURE READING WITHOUT DIAGNOSIS OF HYPERTENSION: Status: ACTIVE | Noted: 2024-05-25

## 2024-05-25 PROBLEM — E83.52 HYPERCALCEMIA: Status: ACTIVE | Noted: 2024-05-25

## 2024-05-25 PROBLEM — E66.9 CLASS 1 OBESITY WITH BODY MASS INDEX (BMI) OF 30.0 TO 30.9 IN ADULT: Status: ACTIVE | Noted: 2024-05-25

## 2024-05-25 LAB
ANION GAP SERPL CALCULATED.3IONS-SCNC: 9 MMOL/L (ref 5–15)
ARTICHOKE IGE QN: 132 MG/DL (ref 0–100)
BH CV ECHO MEAS - AO MAX PG: 5.6 MMHG
BH CV ECHO MEAS - AO MEAN PG: 3 MMHG
BH CV ECHO MEAS - AO V2 MAX: 118 CM/SEC
BH CV ECHO MEAS - AO V2 VTI: 27.2 CM
BH CV ECHO MEAS - AVA(I,D): 3 CM2
BH CV ECHO MEAS - EDV(CUBED): 110.6 ML
BH CV ECHO MEAS - EDV(MOD-SP2): 121 ML
BH CV ECHO MEAS - EDV(MOD-SP4): 92.4 ML
BH CV ECHO MEAS - EF(MOD-BP): 51 %
BH CV ECHO MEAS - EF(MOD-SP2): 47.4 %
BH CV ECHO MEAS - EF(MOD-SP4): 51.5 %
BH CV ECHO MEAS - ESV(CUBED): 46.7 ML
BH CV ECHO MEAS - ESV(MOD-SP2): 63.7 ML
BH CV ECHO MEAS - ESV(MOD-SP4): 44.8 ML
BH CV ECHO MEAS - FS: 25 %
BH CV ECHO MEAS - IVS/LVPW: 0.83 CM
BH CV ECHO MEAS - IVSD: 0.95 CM
BH CV ECHO MEAS - LA DIMENSION: 4.7 CM
BH CV ECHO MEAS - LAT PEAK E' VEL: 11.9 CM/SEC
BH CV ECHO MEAS - LV DIASTOLIC VOL/BSA (35-75): 46.5 CM2
BH CV ECHO MEAS - LV MASS(C)D: 181.9 GRAMS
BH CV ECHO MEAS - LV MAX PG: 2.9 MMHG
BH CV ECHO MEAS - LV MEAN PG: 2 MMHG
BH CV ECHO MEAS - LV SYSTOLIC VOL/BSA (12-30): 22.5 CM2
BH CV ECHO MEAS - LV V1 MAX: 85.5 CM/SEC
BH CV ECHO MEAS - LV V1 VTI: 19.5 CM
BH CV ECHO MEAS - LVIDD: 4.8 CM
BH CV ECHO MEAS - LVIDS: 3.6 CM
BH CV ECHO MEAS - LVOT AREA: 4.2 CM2
BH CV ECHO MEAS - LVOT DIAM: 2.3 CM
BH CV ECHO MEAS - LVPWD: 1.15 CM
BH CV ECHO MEAS - MED PEAK E' VEL: 5.2 CM/SEC
BH CV ECHO MEAS - MR MAX PG: 207.9 MMHG
BH CV ECHO MEAS - MR MAX VEL: 721 CM/SEC
BH CV ECHO MEAS - MV A MAX VEL: 120 CM/SEC
BH CV ECHO MEAS - MV DEC SLOPE: 384 CM/SEC2
BH CV ECHO MEAS - MV E MAX VEL: 73.7 CM/SEC
BH CV ECHO MEAS - MV E/A: 0.61
BH CV ECHO MEAS - MV P1/2T: 67.4 MSEC
BH CV ECHO MEAS - MVA(P1/2T): 3.3 CM2
BH CV ECHO MEAS - PA V2 MAX: 72.9 CM/SEC
BH CV ECHO MEAS - RAP SYSTOLE: 5 MMHG
BH CV ECHO MEAS - RV MAX PG: 1 MMHG
BH CV ECHO MEAS - RV V1 MAX: 50.1 CM/SEC
BH CV ECHO MEAS - RVDD: 2.8 CM
BH CV ECHO MEAS - RVSP: 31.4 MMHG
BH CV ECHO MEAS - SV(LVOT): 81 ML
BH CV ECHO MEAS - SV(MOD-SP2): 57.3 ML
BH CV ECHO MEAS - SV(MOD-SP4): 47.6 ML
BH CV ECHO MEAS - SVI(LVOT): 40.8 ML/M2
BH CV ECHO MEAS - SVI(MOD-SP2): 28.8 ML/M2
BH CV ECHO MEAS - SVI(MOD-SP4): 24 ML/M2
BH CV ECHO MEAS - TAPSE (>1.6): 2 CM
BH CV ECHO MEAS - TR MAX PG: 26.4 MMHG
BH CV ECHO MEAS - TR MAX VEL: 257 CM/SEC
BH CV ECHO MEASUREMENTS AVERAGE E/E' RATIO: 8.62
BH CV ECHO SHUNT ASSESSMENT PERFORMED (HIDDEN SCRIPTING): 1
BH CV XLRA - RV BASE: 2.1 CM
BUN SERPL-MCNC: 13 MG/DL (ref 8–23)
BUN/CREAT SERPL: 12.9 (ref 7–25)
CALCIUM SPEC-SCNC: 10.6 MG/DL (ref 8.6–10.5)
CHLORIDE SERPL-SCNC: 104 MMOL/L (ref 98–107)
CHOLEST SERPL-MCNC: 188 MG/DL (ref 0–200)
CO2 SERPL-SCNC: 28 MMOL/L (ref 22–29)
CREAT SERPL-MCNC: 1.01 MG/DL (ref 0.76–1.27)
EGFRCR SERPLBLD CKD-EPI 2021: 79.5 ML/MIN/1.73
GLUCOSE BLDC GLUCOMTR-MCNC: 114 MG/DL (ref 70–130)
GLUCOSE SERPL-MCNC: 88 MG/DL (ref 65–99)
HBA1C MFR BLD: 5.1 % (ref 4.8–5.6)
HDLC SERPL-MCNC: 35 MG/DL (ref 40–60)
LDLC SERPL CALC-MCNC: 136 MG/DL (ref 0–100)
LDLC/HDLC SERPL: 3.85 {RATIO}
LEFT ATRIUM VOLUME INDEX: 19.4 ML/M2
LEFT ATRIUM VOLUME: 38.6 ML
LV EF 2D ECHO EST: 50 %
POTASSIUM SERPL-SCNC: 3.9 MMOL/L (ref 3.5–5.2)
SODIUM SERPL-SCNC: 141 MMOL/L (ref 136–145)
TRIGL SERPL-MCNC: 92 MG/DL (ref 0–150)
TSH SERPL DL<=0.05 MIU/L-ACNC: 0.89 UIU/ML (ref 0.27–4.2)
VLDLC SERPL-MCNC: 17 MG/DL (ref 5–40)

## 2024-05-25 PROCEDURE — 83036 HEMOGLOBIN GLYCOSYLATED A1C: CPT | Performed by: INTERNAL MEDICINE

## 2024-05-25 PROCEDURE — 80048 BASIC METABOLIC PNL TOTAL CA: CPT | Performed by: NURSE PRACTITIONER

## 2024-05-25 PROCEDURE — 25510000001 PERFLUTREN 6.52 MG/ML SUSPENSION: Performed by: INTERNAL MEDICINE

## 2024-05-25 PROCEDURE — 97161 PT EVAL LOW COMPLEX 20 MIN: CPT | Performed by: PHYSICAL THERAPIST

## 2024-05-25 PROCEDURE — 99223 1ST HOSP IP/OBS HIGH 75: CPT | Performed by: PSYCHIATRY & NEUROLOGY

## 2024-05-25 PROCEDURE — 93306 TTE W/DOPPLER COMPLETE: CPT | Performed by: INTERNAL MEDICINE

## 2024-05-25 PROCEDURE — 80061 LIPID PANEL: CPT | Performed by: INTERNAL MEDICINE

## 2024-05-25 PROCEDURE — 97165 OT EVAL LOW COMPLEX 30 MIN: CPT

## 2024-05-25 PROCEDURE — 83721 ASSAY OF BLOOD LIPOPROTEIN: CPT | Performed by: PSYCHIATRY & NEUROLOGY

## 2024-05-25 PROCEDURE — 84443 ASSAY THYROID STIM HORMONE: CPT | Performed by: NURSE PRACTITIONER

## 2024-05-25 PROCEDURE — 82948 REAGENT STRIP/BLOOD GLUCOSE: CPT

## 2024-05-25 PROCEDURE — 70498 CT ANGIOGRAPHY NECK: CPT

## 2024-05-25 PROCEDURE — 25810000003 SODIUM CHLORIDE 0.9 % SOLUTION: Performed by: NURSE PRACTITIONER

## 2024-05-25 PROCEDURE — 25510000001 IOPAMIDOL PER 1 ML: Performed by: INTERNAL MEDICINE

## 2024-05-25 PROCEDURE — 93306 TTE W/DOPPLER COMPLETE: CPT

## 2024-05-25 RX ORDER — TAMSULOSIN HYDROCHLORIDE 0.4 MG/1
0.4 CAPSULE ORAL NIGHTLY
Status: DISCONTINUED | OUTPATIENT
Start: 2024-05-25 | End: 2024-05-26 | Stop reason: HOSPADM

## 2024-05-25 RX ORDER — AMLODIPINE BESYLATE 5 MG/1
5 TABLET ORAL
Status: DISCONTINUED | OUTPATIENT
Start: 2024-05-25 | End: 2024-05-26 | Stop reason: HOSPADM

## 2024-05-25 RX ORDER — SODIUM CHLORIDE 9 MG/ML
75 INJECTION, SOLUTION INTRAVENOUS CONTINUOUS
Status: DISCONTINUED | OUTPATIENT
Start: 2024-05-25 | End: 2024-05-25

## 2024-05-25 RX ORDER — CLOPIDOGREL BISULFATE 75 MG/1
75 TABLET ORAL DAILY
Status: DISCONTINUED | OUTPATIENT
Start: 2024-05-25 | End: 2024-05-26 | Stop reason: HOSPADM

## 2024-05-25 RX ADMIN — TAMSULOSIN HYDROCHLORIDE 0.4 MG: 0.4 CAPSULE ORAL at 21:24

## 2024-05-25 RX ADMIN — ATORVASTATIN CALCIUM 80 MG: 40 TABLET ORAL at 21:00

## 2024-05-25 RX ADMIN — SODIUM CHLORIDE 75 ML/HR: 9 INJECTION, SOLUTION INTRAVENOUS at 10:24

## 2024-05-25 RX ADMIN — ASPIRIN 81 MG: 81 TABLET, CHEWABLE ORAL at 08:41

## 2024-05-25 RX ADMIN — IOPAMIDOL 100 ML: 755 INJECTION, SOLUTION INTRAVENOUS at 11:34

## 2024-05-25 RX ADMIN — AMLODIPINE BESYLATE 5 MG: 5 TABLET ORAL at 15:23

## 2024-05-25 RX ADMIN — PERFLUTREN 13.04 MG: 6.52 INJECTION, SUSPENSION INTRAVENOUS at 14:55

## 2024-05-25 RX ADMIN — FINASTERIDE 5 MG: 5 TABLET, FILM COATED ORAL at 08:42

## 2024-05-25 RX ADMIN — Medication 10 ML: at 21:00

## 2024-05-25 RX ADMIN — CLOPIDOGREL BISULFATE 75 MG: 75 TABLET, FILM COATED ORAL at 12:57

## 2024-05-25 RX ADMIN — Medication 10 ML: at 08:42

## 2024-05-25 NOTE — ED NOTES
Nursing report ED to floor  Austin Beltran  69 y.o.  male    HPI:   Chief Complaint   Patient presents with    Stroke       Admitting doctor:   Mal Houston DO    Consulting provider(s):  Consults       No orders found from 4/25/2024 to 5/25/2024.             Admitting diagnosis:   The primary encounter diagnosis was Cerebrovascular accident (CVA), unspecified mechanism. A diagnosis of Near syncope was also pertinent to this visit.    Code status:   Current Code Status       Date Active Code Status Order ID Comments User Context       Not on file            Allergies:   Demerol [meperidine]    Intake and Output  No intake or output data in the 24 hours ending 05/24/24 1933    Weight:       05/24/24  1718   Weight: 88 kg (194 lb)       Most recent vitals:   Vitals:    05/24/24 1846 05/24/24 1901 05/24/24 1926 05/24/24 1927   BP: 168/73 145/92 159/92    BP Location:       Patient Position:       Pulse: 82 79 83    Resp:       Temp:       TempSrc:       SpO2: 93% 97%  95%   Weight:       Height:         Oxygen Therapy: .    Active LDAs/IV Access:   Lines, Drains & Airways       Active LDAs       Name Placement date Placement time Site Days    Peripheral IV 05/24/24 1805 Left Antecubital 05/24/24  1805  Antecubital  less than 1                    Labs (abnormal labs have a star):   Labs Reviewed   BASIC METABOLIC PANEL - Abnormal; Notable for the following components:       Result Value    CO2 31.0 (*)     Calcium 11.1 (*)     All other components within normal limits    Narrative:     GFR Normal >60  Chronic Kidney Disease <60  Kidney Failure <15    The GFR formula is only valid for adults with stable renal function between ages 18 and 70.   MAGNESIUM - Normal   CBC WITH AUTO DIFFERENTIAL - Normal   CBC AND DIFFERENTIAL    Narrative:     The following orders were created for panel order CBC & Differential.  Procedure                               Abnormality         Status                     ---------                                -----------         ------                     CBC Auto Differential[[id number]]        Normal              Final result                 Please view results for these tests on the individual orders.       Meds given in ED:   Medications - No data to display  No current facility-administered medications for this encounter.       NIH Stroke Scale:       Isolation/Infection(s):  No active isolations   No active infections     COVID Testing  Collected .  Resulted .    Nursing report ED to floor:  Mental status: .  Ambulatory status: .  Precautions: .    ED nurse phone extentsion- ..

## 2024-05-25 NOTE — THERAPY DISCHARGE NOTE
Patient Name: Austin Beltran  : 1955    MRN: [id number]                              Today's Date: 2024       Admit Date: 2024    Visit Dx:     ICD-10-CM ICD-9-CM   1. Cerebrovascular accident (CVA), unspecified mechanism  I63.9 434.91   2. Near syncope  R55 780.2     Patient Active Problem List   Diagnosis    Bladder calculus    Stroke     Past Medical History:   Diagnosis Date    Arthritis     Bladder stones     Kidney stones     Urinary incontinence      Past Surgical History:   Procedure Laterality Date    CYSTOSCOPY BLADDER STONE LITHOTRIPSY N/A 2020    Procedure: CYSTOSCOPY LASER LITHOTRIPSY AND REMOVAL BLADDER STONES;  Surgeon: Иван Mejia MD;  Location: [facility];  Service: Urology;  Laterality: N/A;    INGUINAL HERNIA REPAIR      KIDNEY STONE SURGERY      X2 SURGERIES    LUMBAR DISCECTOMY        General Information       Row Name 24 0852          Physical Therapy Time and Intention    Document Type evaluation  MRI head-acute to subacute punctate R cerebellar infarct, syncopal episode, fall hitting head, feeling foggy headed  -MS     Mode of Treatment physical therapy;individual therapy  -MS       Row Name 24 0852          General Information    Patient Profile Reviewed yes  -MS     Prior Level of Function independent:;all household mobility;community mobility;ADL's;driving  -MS     Existing Precautions/Restrictions fall  -MS     Barriers to Rehab none identified  -MS       Row Name 24 0852          Living Environment    People in Home spouse  -MS       Row Name 24 0852          Home Main Entrance    Number of Stairs, Main Entrance four  -MS     Stair Railings, Main Entrance railing on left side (ascending)  -MS       Row Name 24 0852          Cognition    Orientation Status (Cognition) oriented x 4  -MS               User Key  (r) = Recorded By, (t) = Taken By, (c) = Cosigned By      Initials Name Provider Type    Theresa Hernandez R, PT,  DPT, NCS Physical Therapist                   Mobility       Row Name 05/25/24 0852          Bed Mobility    Comment, (Bed Mobility) sitting EOB  -MS       Row Name 05/25/24 0852          Sit-Stand Transfer    Sit-Stand [place] (Transfers) independent  -MS       Row Name 05/25/24 0852          Gait/Stairs (Locomotion)    [place] Level (Gait) independent  -MS     Distance in Feet (Gait) 50  -MS     Comment, (Gait/Stairs) pt has been up ambulating in hallway with OT. Pt ambulated in room around objects and stepping over 3in objects independently  -MS               User Key  (r) = Recorded By, (t) = Taken By, (c) = Cosigned By      Initials Name Provider Type    MS Kavin Theresa R, PT, DPT, NCS Physical Therapist                   Obj/Interventions       Row Name 05/25/24 0852          Range of Motion Comprehensive    General Range of Motion bilateral lower extremity ROM WFL  -MS     Comment, General Range of Motion L shoulder ROM limited 50% due to fall with shoulder injury 6 months ago, all other BUE joints WFL AROM  -MS       Row Name 05/25/24 0852          Strength Comprehensive (MMT)    Comment, General Manual Muscle Testing (MMT) Assessment B LE 5/5  -MS       Row Name 05/25/24 0852          Motor Skills    Motor Skills coordination  -MS     Coordination WNL  -MS       Row Name 05/25/24 0852          Balance    Balance Assessment sitting static balance;sitting dynamic balance;standing static balance;standing dynamic balance  -MS     Static Sitting Balance independent  -MS     Dynamic Sitting Balance independent  -MS     Position, Sitting Balance unsupported;sitting edge of bed  -MS     Static Standing Balance independent  -MS     Dynamic Standing Balance independent  -MS     Position/Device Used, Standing Balance unsupported  -MS       Row Name 05/25/24 0852          Sensory Assessment (Somatosensory)    Sensory Assessment (Somatosensory) sensation intact  -MS               User Key  (r) = Recorded By,  (t) = Taken By, (c) = Cosigned By      Initials Name Provider Type    MS Theresa Vale, PT, DPT, NCS Physical Therapist                   Goals/Plan    No documentation.                  Clinical Impression       Row Name 05/25/24 0852          Pain    Pretreatment Pain Rating 0/10 - no pain  -MS     Posttreatment Pain Rating 0/10 - no pain  -MS       Row Name 05/25/24 0852          Plan of Care Review    Plan of Care Reviewed With patient  -MS     Progress no change  -MS     Outcome Evaluation The patient presents alert and oriented x4 sitting EOB. He demonstrates no focal neurological deficits in strength, senstation, or coordination. He was able to ambulate independently and navigate around and over objects safely. He has no further needs for PT at this time. Recommend discharge home with assist.  -MS       Row Name 05/25/24 0852          Therapy Assessment/Plan (PT)    Criteria for Skilled Interventions Met (PT) no problems identified which require skilled intervention  -MS     Therapy Frequency (PT) evaluation only  -MS       Row Name 05/25/24 0852          Positioning and Restraints    Post Treatment Position bed  -MS     In Bed sitting EOB;call light within reach;encouraged to call for assist;with family/caregiver;side rails up x2  -MS               User Key  (r) = Recorded By, (t) = Taken By, (c) = Cosigned By      Initials Name Provider Type    MS Kavin Theresa SPENCER, PT, DPT, NCS Physical Therapist                   Outcome Measures       Row Name 05/25/24 0852 05/24/24 0405       How much help from another person do you currently need...    Turning from your back to your side while in flat bed without using bedrails? 4  -MS 4  -HH    Moving from lying on back to sitting on the side of a flat bed without bedrails? 4  -MS 4  -HH    Moving to and from a bed to a chair (including a wheelchair)? 4  -MS 3  -HH    Standing up from a chair using your arms (e.g., wheelchair, bedside chair)? 4  -MS 3  -HH     Climbing 3-5 steps with a railing? 4  -MS 3  -HH    To walk in hospital room? 4  -MS 3  -HH    AM-PAC 6 Clicks Score (PT) 24  -MS 20  -HH    Highest Level of Mobility Goal 8 --> Walked 250 feet or more  -MS 6 --> Walk 10 steps or more  -      Row Name 05/24/24 3698          How much help from another person do you currently need...    Turning from your back to your side while in flat bed without using bedrails? 4  -TM     Moving from lying on back to sitting on the side of a flat bed without bedrails? 4  -TM     Moving to and from a bed to a chair (including a wheelchair)? 4  -TM     Standing up from a chair using your arms (e.g., wheelchair, bedside chair)? 4  -TM     Climbing 3-5 steps with a railing? 3  -TM     To walk in hospital room? 3  -TM     AM-PAC 6 Clicks Score (PT) 22  -TM     Highest Level of Mobility Goal 7 --> Walk 25 feet or more  -       Row Name 05/25/24 0901 05/25/24 0852       Modified Fabio Scale    Pre-Stroke Modified [place]kinson Scale 0 - No Symptoms at all.  -AC --    Modified [place] Scale 0 - No Symptoms at all.  -AC 0 - No Symptoms at all.  -MS      Row Name 05/25/24 0901 05/25/24 0852       Functional Assessment    Outcome Measure Options AM-PAC 6 Clicks Daily Activity (OT);Modified [place]  -AC AM-PAC 6 Clicks Basic Mobility (PT);Modified [place]kinson  -MS              User Key  (r) = Recorded By, (t) = Taken By, (c) = Cosigned By      Initials Name Provider Type    AC Ravi Bowman N, OTR/L, CNT Occupational Therapist    MS Theresa Vale R, PT, DPT, NCS Physical Therapist    TM Lawrence Krueger, RN Registered Nurse     Nayana Dumas, RN Registered Nurse                    PT Recommendation and Plan     Plan of Care Reviewed With: patient  Progress: no change  Outcome Evaluation: The patient presents alert and oriented x4 sitting EOB. He demonstrates no focal neurological deficits in strength, senstation, or coordination. He was able to ambulate independently and navigate around and over  objects safely. He has no further needs for PT at this time. Recommend discharge home with assist.     Time Calculation:         PT Charges       Row Name 05/25/24 0852             Time Calculation    Start Time 0845  -MS      Stop Time 0916  -MS      Time Calculation (min) 31 min  -MS      PT Received On 05/25/24  -MS         Untimed Charges    PT Eval/Re-eval Minutes 31  -MS         Total Minutes    Untimed Charges Total Minutes 31  -MS       Total Minutes 31  -MS                User Key  (r) = Recorded By, (t) = Taken By, (c) = Cosigned By      Initials Name Provider Type    Theresa Hernandez, PT, DPT, NCS Physical Therapist                      PT G-Codes  Outcome Measure Options: AM-PAC 6 Clicks Daily Activity (OT), Modified [place]  AM-PAC 6 Clicks Score (PT): 24  AM-PAC 6 Clicks Score (OT): 24  Modified [place] Scale: 0 - No Symptoms at all.    PT Discharge Summary  Anticipated Discharge Disposition (PT): home with assist    Theresa Vale, PT, DPT, NCS  5/25/2024

## 2024-05-25 NOTE — CASE MANAGEMENT/SOCIAL WORK
Discharge Planning Assessment   [place]     Patient Name: Austin Beltran  MRN: [id number]  Today's Date: 5/25/2024    Admit Date: 5/24/2024        Discharge Needs Assessment       Row Name 05/25/24 0917       Living Environment    People in Home spouse    Name(s) of People in Home Snow Beltran    Current Living Arrangements home    Potentially Unsafe Housing Conditions none    In the past 12 months has the electric, gas, oil, or water company threatened to shut off services in your home? No    Primary Care Provided by self    Provides Primary Care For no one    Family Caregiver if Needed spouse    Family Caregiver Names Snow Beltran    Quality of Family Relationships supportive;helpful;involved    Able to Return to Prior Arrangements yes       Resource/Environmental Concerns    Resource/Environmental Concerns none    Transportation Concerns none       Transportation Needs    In the past 12 months, has lack of transportation kept you from medical appointments or from getting medications? no    In the past 12 months, has lack of transportation kept you from meetings, work, or from getting things needed for daily living? No       Food Insecurity    Within the past 12 months, you worried that your food would run out before you got the money to buy more. Never true    Within the past 12 months, the food you bought just didn't last and you didn't have money to get more. Never true       Transition Planning    Patient/Family Anticipates Transition to home with family    Patient/Family Anticipated Services at Transition none    Transportation Anticipated family or friend will provide       Discharge Needs Assessment    Readmission Within the Last 30 Days no previous admission in last 30 days    Equipment Currently Used at Home none    Concerns to be Addressed no discharge needs identified    Anticipated Changes Related to Illness none    Equipment Needed After Discharge none    Discharge Coordination/Progress Social  service consult received for stroke.  Patient resides at home with his spouse and is independent.  Patient does not utilize DME or HH.  Patient has a PCP and RX coverage.  Patient has been evaluated by occupational therapy who has assessed he is back to baseline functioning.  Patient will discharge home with no anticipated needs.                   Discharge Plan    No documentation.                 Continued Care and Services - Admitted Since 5/24/2024    No active coordination exists for this encounter.          Demographic Summary    No documentation.                  Functional Status    No documentation.                  Psychosocial    No documentation.                  Abuse/Neglect    No documentation.                  Legal    No documentation.                  Substance Abuse    No documentation.                  Patient Forms    No documentation.                     ORA Patel

## 2024-05-25 NOTE — PLAN OF CARE
Goal Outcome Evaluation:  Plan of Care Reviewed With: patient        Progress: no change  Outcome Evaluation: OT eval completed.  Pt alert and oriented x4.  Pt is typically independent with all ADL and mobility.  He reports feeling better today than he has since onset of symptoms.  Admission symptoms of dizziness/syncope, imbalance and tingling in L fingers have resolved per pt.  He has BUE AROM WFL except in L shoulder due to previous injury from a fall.  His strength is 5/5 in BUE.  No sensory or coordination defcit noted.  Pt uses BUE appropriately for functional tasks including donning/doffing socks and shoes, opening bottle and transferring objects from hand to hand.  He transferred independently from bed and commode and amb in hallway with SBA.  Pt did have one mild sway to L when exiting BR but was aware of this and self corrected.  At this time, OT is not indicated as pt is at baseline function.  Recommend discharge home with assist.      Anticipated Discharge Disposition (OT): home with assist

## 2024-05-25 NOTE — H&P
"    [facility] Medicine Services  HISTORY AND PHYSICAL    Date of Admission: 5/24/2024  Primary Care Physician: Sean Shaffer DO    Subjective   Primary Historian: Patient    Chief Complaint: Off balance    Stroke    69-year-old male who presents to the emergency department with due to near syncopal events.  The patient was in a hotel in [place] with his wife.  He was on the toilet and passed out.  He describes \"blacked out.\"  The wife states he fell off the toilet and hit his head on the counter.  She notes that he had some facial abrasions.  He did present to the emergency department on Tuesday.  His NIH at that time was 1.  He did describe some left hand tingling, he notes fingers 1 through 4.  He continued to have near syncopal episodes.  He states on the way home he had several.  He had 5 to 6 pills on Wednesday.  He states he feels them coming over him.  He has had them while sitting down and while walking.  The episode during walking felt like his legs got to Jell-O and if he did not sit down he would pass out.  This morning he woke up very foggy.  He describes lightheadedness.  He does have a pain in the back of the head, but thinks that this is from where he hit his head from his previous spell.  He saw his PCP who scheduled an MRI of his brain and cervical spine.  The cervical spine studies was done without contrast.  The wife does have these on his MyChart, and I was able to look at the studies.  It appears that the MRI of the brain shows a subacute to acute punctate right cerebellar infarct.  When asked the patient if he feels like he could stand up, if he would be off balance.  He states yes.  The wife does not notice leaning towards 1 side, but states he staggers in between both sides the right and left.  The patient denies any type of vision loss.  He can use his iPad without coordination difficulties or vision difficulty.  He does note some mild nausea, and " his wife states that she did have to give him a Zofran this morning.        Review of Systems   Neurological:         Off balance      Otherwise complete ROS reviewed and negative except as mentioned in the HPI.    Past Medical History:   Past Medical History:   Diagnosis Date    Arthritis     Bladder stones     Kidney stones     Urinary incontinence      Past Surgical History:  Past Surgical History:   Procedure Laterality Date    CYSTOSCOPY BLADDER STONE LITHOTRIPSY N/A 11/23/2020    Procedure: CYSTOSCOPY LASER LITHOTRIPSY AND REMOVAL BLADDER STONES;  Surgeon: Иван Mejia MD;  Location: [facility];  Service: Urology;  Laterality: N/A;    INGUINAL HERNIA REPAIR  1990    KIDNEY STONE SURGERY      X2 SURGERIES    LUMBAR DISCECTOMY  2000     Social History:  reports that he has never smoked. He has never used smokeless tobacco. He reports current alcohol use. He reports that he does not use drugs.    Family History: None    Allergies:  Allergies   Allergen Reactions    Demerol [Meperidine] Hallucinations       Medications:  Prior to Admission medications    Medication Sig Start Date End Date Taking? Authorizing Provider   cephalexin (Keflex) 500 MG capsule Take 1 capsule by mouth 2 (Two) Times a Day. 11/23/20   Иван Mejia MD   finasteride (PROSCAR) 5 MG tablet Take 5 mg by mouth Daily.    Jimbo Bravo MD   HYDROcodone-acetaminophen (NORCO) 5-325 MG per tablet Take 1 tablet by mouth Every 8 (Eight) Hours As Needed for Moderate Pain  (Pain). 11/23/20   Иван Mejia MD   nitrofurantoin (MACRODANTIN) 50 MG capsule Take 50 mg by mouth Every Night.    Jimbo Bravo MD   tamsulosin (FLOMAX) 0.4 MG capsule 24 hr capsule Take 1 capsule by mouth Daily.    Jimbo Bravo MD     I have utilized all available immediate resources to obtain, update, or review the patient's current medications (including all prescriptions, over-the-counter products, herbals, cannabis/cannabidiol products, and  "vitamin/mineral/dietary (nutritional) supplements).    Objective     Vital Signs: /92   Pulse 83   Temp 98.3 °F (36.8 °C) (Oral)   Resp 18   Ht 170.2 cm (67\")   Wt 88 kg (194 lb)   SpO2 95%   BMI 30.38 kg/m²   Physical Exam  Vitals reviewed.   Constitutional:       Appearance: Normal appearance.   HENT:      Head: Normocephalic and atraumatic.      Right Ear: External ear normal.      Left Ear: External ear normal.      Nose: Nose normal.   Eyes:      General: No scleral icterus.     Conjunctiva/sclera: Conjunctivae normal.   Cardiovascular:      Rate and Rhythm: Normal rate and regular rhythm.      Heart sounds: Normal heart sounds.   Pulmonary:      Effort: Pulmonary effort is normal.      Breath sounds: Normal breath sounds.   Abdominal:      General: There is no distension.      Palpations: Abdomen is soft.      Tenderness: There is no abdominal tenderness.   Musculoskeletal:         General: No swelling or tenderness.      Cervical back: Normal range of motion and neck supple.   Skin:     General: Skin is warm and dry.   Neurological:      Mental Status: He is alert and oriented to person, place, and time.      Cranial Nerves: No cranial nerve deficit.   Psychiatric:         Mood and Affect: Mood normal.         Behavior: Behavior normal.          Results Reviewed:  Lab Results (last 24 hours)       Procedure Component Value Units Date/Time    Basic Metabolic Panel [[id number]]  (Abnormal) Collected: 05/24/24 1804    Specimen: Blood Updated: 05/24/24 1838     Glucose 96 mg/dL      BUN 12 mg/dL      Creatinine 1.01 mg/dL      Sodium 141 mmol/L      Potassium 4.1 mmol/L      Comment: Slight hemolysis detected by analyzer. Result may be falsely elevated.        Chloride 104 mmol/L      CO2 31.0 mmol/L      Calcium 11.1 mg/dL      BUN/Creatinine Ratio 11.9     Anion Gap 6.0 mmol/L      eGFR 79.5 mL/min/1.73     Narrative:      GFR Normal >60  Chronic Kidney Disease <60  Kidney Failure <15    The GFR " formula is only valid for adults with stable renal function between ages 18 and 70.    Magnesium [[id number]]  (Normal) Collected: 05/24/24 1804    Specimen: Blood Updated: 05/24/24 1838     Magnesium 2.3 mg/dL     CBC & Differential [[id number]]  (Normal) Collected: 05/24/24 1804    Specimen: Blood Updated: 05/24/24 1818    Narrative:      The following orders were created for panel order CBC & Differential.  Procedure                               Abnormality         Status                     ---------                               -----------         ------                     CBC Auto Differential[[id number]]        Normal              Final result                 Please view results for these tests on the individual orders.    CBC Auto Differential [[id number]]  (Normal) Collected: 05/24/24 1804    Specimen: Blood Updated: 05/24/24 1818     WBC 7.62 10*3/mm3      RBC 4.91 10*6/mm3      Hemoglobin 14.7 g/dL      Hematocrit 43.7 %      MCV 89.0 fL      MCH 29.9 pg      MCHC 33.6 g/dL      RDW 13.0 %      RDW-SD 42.5 fl      MPV 9.7 fL      Platelets 239 10*3/mm3      Neutrophil % 60.8 %      Lymphocyte % 28.3 %      Monocyte % 8.1 %      Eosinophil % 1.7 %      Basophil % 0.8 %      Immature Grans % 0.3 %      Neutrophils, Absolute 4.63 10*3/mm3      Lymphocytes, Absolute 2.16 10*3/mm3      Monocytes, Absolute 0.62 10*3/mm3      Eosinophils, Absolute 0.13 10*3/mm3      Basophils, Absolute 0.06 10*3/mm3      Immature Grans, Absolute 0.02 10*3/mm3      nRBC 0.0 /100 WBC           Imaging Results (Last 24 Hours)       ** No results found for the last 24 hours. **          I have personally reviewed and interpreted the radiology studies and ECG obtained at time of admission.     Assessment / Plan   Assessment:   Active Hospital Problems    Diagnosis     **Stroke      Impression:  CVA (acute/sub-acute right CB infarct)  Hypertension  Hypercalcemia  BPH    Treatment Plan  CVA stroke orders.  Follow BP  iPTH  Resume home  medications  Neurology consult  Cardiac echo and carotid study in the am    The patient will be admitted to my service here at [facility].  Primary team to take over in the morning    Medical Decision Making  Number and Complexity of problems: 4, complex  Differential Diagnosis: ICH    Conditions and Status        Condition is unchanged.     [facility] Data  External documents reviewed: None  Cardiac tracing (EKG, telemetry) interpretation: None  Radiology interpretation: None  Labs reviewed: Reviewed  Any tests that were considered but not ordered: None     Decision rules/scores evaluated (example [id number], [place], etc): None     Discussed with: Patient and wife     Care Planning  Shared decision making: Patient, family, and ED staff  Code status and discussions: Full    Disposition  Social Determinants of Health that impact treatment or disposition: None  Estimated length of stay is overnight.     I confirmed that the patient's advanced care plan is present, code status is documented, and a surrogate decision maker is listed in the patient's medical record.     The patient's surrogate decision maker is family.     The patient was seen and examined by me on 5/24/2024 at 730.    Electronically signed by Mal Houston DO, 05/24/24, 20:04 CDT.

## 2024-05-25 NOTE — PROGRESS NOTES
Nutrition Services    Patient Name:  Austin Beltran  YOB: 1955  MRN: [id number]  Admit Date:  5/24/2024    DM Education Consult received per stroke protocol. Review of labs HgbA1c 5.1% indicate DM education not necessary at this time. RD/inpatient nutrition services team will continue to follow per protocol.      Electronically signed by:  Julissa Olivares RDN, LD  05/25/24 16:54 CDT

## 2024-05-25 NOTE — PLAN OF CARE
Goal Outcome Evaluation:  Plan of Care Reviewed With: patient        Progress: no change  Outcome Evaluation: The patient presents alert and oriented x4 sitting EOB. He demonstrates no focal neurological deficits in strength, senstation, or coordination. He was able to ambulate independently and navigate around and over objects safely. He has no further needs for PT at this time. Recommend discharge home with assist.      Anticipated Discharge Disposition (PT): home with assist

## 2024-05-25 NOTE — PROGRESS NOTES
[facility] Medicine Services  INPATIENT PROGRESS NOTE    Patient Name: Austin Beltran  Date of Admission: 5/24/2024  Today's Date: 05/25/24  Length of Stay: 1  Primary Care Physician: Sean Shaffer DO    Subjective   Chief Complaint: dizziness  HPI   He was sitting on the edge of the bed with his spouse at bedside.  He has had numerous syncopal events over the past days. He becomes lightheaded and dizzy with position changes.  He does complain of some symptoms of positional vertigo.  No unilateral limb weakness, numbness.    Review of Systems   All pertinent negatives and positives are as above. All other systems have been reviewed and are negative unless otherwise stated.     Objective    Temp:  [97.8 °F (36.6 °C)-98.3 °F (36.8 °C)] 97.8 °F (36.6 °C)  Heart Rate:  [68-83] 76  Resp:  [16-18] 16  BP: (138-173)/() 163/96  Physical Exam  Vitals reviewed.   Constitutional:       General: He is not in acute distress.     Appearance: He is not toxic-appearing.      Comments: Sitting on the edge of the bed.  No acute distress.  On room air.  Spouse at bedside.   HENT:      Head: Normocephalic and atraumatic.      Mouth/Throat:      Mouth: Mucous membranes are moist.      Pharynx: Oropharynx is clear.   Eyes:      Extraocular Movements: Extraocular movements intact.      Conjunctiva/sclera: Conjunctivae normal.      Pupils: Pupils are equal, round, and reactive to light.   Cardiovascular:      Rate and Rhythm: Normal rate and regular rhythm.      Pulses: Normal pulses.      Comments: Sinus 64-79  Pulmonary:      Effort: Pulmonary effort is normal. No respiratory distress.      Breath sounds: Normal breath sounds. No wheezing.   Abdominal:      General: Bowel sounds are normal. There is no distension.      Palpations: Abdomen is soft.      Tenderness: There is no abdominal tenderness.   Musculoskeletal:         General: No swelling or tenderness. Normal range of motion.       Cervical back: Normal range of motion and neck supple. No muscular tenderness.   Skin:     General: Skin is warm and dry.      Findings: No erythema or rash.   Neurological:      General: No focal deficit present.      Mental Status: He is alert and oriented to person, place, and time.      Cranial Nerves: No cranial nerve deficit.      Motor: No weakness.   Psychiatric:         Mood and Affect: Mood normal.         Behavior: Behavior normal.         Results Review:  I have reviewed the labs, radiology results, and diagnostic studies.    Laboratory Data:   Results from last 7 days   Lab Units 05/24/24  1804 05/21/24  1235   WBC 10*3/mm3 7.62 6.87   HEMOGLOBIN g/dL 14.7 14.6   HEMATOCRIT % 43.7 44.7   PLATELETS 10*3/mm3 239 249        Results from last 7 days   Lab Units 05/25/24  0528 05/24/24  1804 05/21/24  1235   SODIUM mmol/L 141 141 140   POTASSIUM mmol/L 3.9 4.1 4.5   CHLORIDE mmol/L 104 104 105   CO2 mmol/L 28.0 31.0* 29.0   BUN mg/dL 13 12 15   CREATININE mg/dL 1.01 1.01 0.92   CALCIUM mg/dL 10.6* 11.1* 11.2*   BILIRUBIN mg/dL  --   --  0.3   ALK PHOS U/L  --   --  69   ALT (SGPT) U/L  --   --  13   AST (SGOT) U/L  --   --  15   GLUCOSE mg/dL 88 96 108*       Culture Data:   Microbiology Results (last 10 days)       ** No results found for the last 240 hours. **          Radiology Data:   Imaging Results (Last 24 Hours)       Procedure Component Value Units Date/Time    CT Angiogram Carotids [[id number]] Collected: 05/25/24 1506     Updated: 05/25/24 1522    Narrative:      Exam: CT ANGIOGRAM CAROTIDS- 5/25/2024 10:23 AM     HISTORY: CVA; I63.9-Cerebral infarction, unspecified; R55-Syncope and  collapse     DOSE LENGTH PRODUCT: 331.5 mGy.cm mGy cm. Automated exposure control was  also utilized to decrease patient radiation dose.     Technique: Axial CT angiogram of the neck after the uneventful  administration of Isovue IV contrast. Sagittal and coronal reformations  were also provided for review. 3D images  were created on an independent  workstation to better evaluate potential vascular abnormalities. NASCET  criteria was used for grading of carotid artery stenosis.     Comparison: Cervical spine CT 5/21/2024..     Findings:     Right carotid system: Mild atherosclerosis without a flow-limiting  stenosis     Left carotid system: Mild atherosclerosis without a flow-limiting  stenosis.     Right vertebral: Mild atherosclerosis without a flow-limiting stenosis.     Left vertebral: Mild atherosclerosis without a flow-limiting stenosis.     OTHER:     Lung apices: Lungs are clear. Partially visualized 3.6 cm fluid  attenuating structure in the left anterior mediastinum (image 129).     Soft tissues: No gross abnormality.     Osseous structures: No acute or suspicious osseous finding. Mild ethmoid  air cell mucosal thickening.     Other: None.       Impression:      Impression:     Mild atherosclerosis without a flow-limiting stenosis or significant  narrowing.     Partially visualized 3.6 cm fluid attenuating structure in the left  anterior mediastinum. Recommend further evaluation with nonemergent CT  chest.     This report was signed and finalized on 5/25/2024 3:19 PM by Neal Anguiano.               I have reviewed the patient's current medications.     Assessment/Plan   Assessment  Active Hospital Problems    Diagnosis     **Acute CVA (cerebrovascular accident)     Hypercalcemia     Elevated blood-pressure reading without diagnosis of hypertension     Hyperlipidemia     Class 1 obesity with body mass index (BMI) of 30.0 to 30.9 in adult      Mr. Beltran is a 69-year-old male who presented to [facility] on 5/24 due to near syncopal events.  Patient has had numerous syncopal spells over the past several days.  He becomes lightheaded and dizzy with position changes.  He explains 1 spell in which he lost consciousness.  No tonic-clonic activity, incontinence or tongue biting.  Within 1-2 minutes he woke up and  was alert and oriented.  No postictal state.  He was seen by his primary care provider on 5/24 and had an MRI brain at [facility] radiology at the request of his PCP that showed possible acute/subacute left cerebellar stroke. He denies any focal limb weakness, numbness, chest pain, shortness of breath.  He was not on antiplatelets at home.    Treatment Plan  Neurology consulted for acute left cerebellar stroke.  Neurology recommends dual antiplatelet therapy with aspirin 81 mg and Plavix 75 mg for 3 weeks and then aspirin 81 mg daily thereafter.    , with goal LDL less than 70.  High intensity statin.    CT angiogram carotids.    Transthoracic echocardiogram.    Orthostatic blood pressure.    Blood pressure elevated with systolic 170s.  He does not take blood pressure medicine.  Outpatient blood pressure goal 140/80.  Last blood pressure reading 163/96.  Start amlodipine 5 mg.  Continue to monitor blood pressure trend.    PT/OT/SLP -no therapy needs.    SCDs for DVT prophylaxis.    Medical Decision Making  Number and Complexity of problems: 3 acute problems  Differential Diagnosis: none considered at present    Conditions and Status        Condition is unchanged.     [facility] Data  External documents reviewed: Prior [facility] records  Cardiac tracing (EKG, telemetry) interpretation: Reviewed  Radiology interpretation: Interpreted by radiology  Labs reviewed: As above  Any tests that were considered but not ordered: none considered at present     Decision rules/scores evaluated (example [id number], Wells, etc): none considered at present     Discussed with: Patient and Dr. Guzmán     Care Planning  Shared decision making: patient is agreeable to ongoing workup and treatment  Code status and discussions: Full code with full interventions    Disposition  Social Determinants of Health that impact treatment or disposition: none  I expect the patient to be discharged to home in 1-2 days.     Electronically signed by  Hannah Kincaid, APRN, 05/25/24, 16:51 CDT.

## 2024-05-25 NOTE — PLAN OF CARE
Goal Outcome Evaluation:                                            Communication evaluation order received per stroke protocol. RN dysphagia screening completed and passed. Per chart review and PT/OT, the patient has no communication impairment which warrants skilled ST evaluation or intervention at this time. SLP will sign off. Please re-consult with any acute changes or concerns.     Theresa Campbell MS CCC-SLP 5/25/2024 10:55 CDT

## 2024-05-25 NOTE — NURSING NOTE
Patient A/O x4 this shift. Family remained at bedside overnight. No neuro/NIH changes. Plan for TTE echo and bilateral carotid CTA in the a.m per orders. Patient had no complaints of pain overnight. Will update oncoming dayshift nurse at bedside report in the a.m. as appropriate.

## 2024-05-25 NOTE — PLAN OF CARE
Goal Outcome Evaluation:      New patient from [facility] on admit and Q4H neuro checks.

## 2024-05-25 NOTE — PLAN OF CARE
Goal Outcome Evaluation:   VSS. A&Ox4. Up ad issac. Room air. Neurochecks. No acute changes. NIH 0. No complaints at this time. Will continue to monitor.

## 2024-05-25 NOTE — CONSULTS
Neurology Consult Note    Consult Date: 2024  Referring MD: Farhan Diallo*      Patient: Austin Beltran (69 y.o. male)  MRN: [id number]  : 1955    History of Present Illness:   Austin Beltran is a 69 y.o. male with a history of hypertension hyperlipidemia who was admitted to the hospital after reportedly having acute stroke.  Patient's had numerous syncopal spells over the past several days.  During those events he reports positional lightheadedness and dizziness.  In one of the spells he completely blacked out with loss of consciousness.  No tonic-clonic activity, incontinence or tongue biting.  He woke up and was alert and oriented after 1 to 2 minutes.  No postictal state.  He denies any prior history of traumatic brain injury or CNS infections.  Yesterday developed some symptoms of ataxia went to have an MRI brain at the request of his PCP.  This was done at an outside facility.  This report apparently were notes a left cerebellar stroke.  He, denies any focal limb weakness, numbness, chest pain, shortness of breath,.  He does complain of some symptoms of positional vertigo.    Medical History:   Past Medical/Surgical Hx:  Past Medical History:   Diagnosis Date    Arthritis     Bladder stones     Kidney stones     Urinary incontinence      Past Surgical History:   Procedure Laterality Date    CYSTOSCOPY BLADDER STONE LITHOTRIPSY N/A 2020    Procedure: CYSTOSCOPY LASER LITHOTRIPSY AND REMOVAL BLADDER STONES;  Surgeon: Иван Mejia MD;  Location: [facility];  Service: Urology;  Laterality: N/A;    INGUINAL HERNIA REPAIR      KIDNEY STONE SURGERY      X2 SURGERIES    LUMBAR DISCECTOMY         Medications On Admission:  Medications Prior to Admission   Medication Sig Dispense Refill Last Dose    tamsulosin (FLOMAX) 0.4 MG capsule 24 hr capsule Take 1 capsule by mouth Daily.   2024    cephalexin (Keflex) 500 MG capsule Take 1 capsule by mouth 2 (Two) Times a Day.  (Patient not taking: Reported on 5/24/2024) 5 capsule 0 Not Taking    finasteride (PROSCAR) 5 MG tablet Take 5 mg by mouth Daily. (Patient not taking: Reported on 5/24/2024)   Not Taking    HYDROcodone-acetaminophen (NORCO) 5-325 MG per tablet Take 1 tablet by mouth Every 8 (Eight) Hours As Needed for Moderate Pain  (Pain). (Patient not taking: Reported on 5/24/2024) 6 tablet 0 Not Taking    nitrofurantoin (MACRODANTIN) 50 MG capsule Take 50 mg by mouth Every Night. (Patient not taking: Reported on 5/24/2024)   Not Taking       Current Medications:    Current Facility-Administered Medications:     acetaminophen (TYLENOL) tablet 650 mg, 650 mg, Oral, Q6H PRN, Mal Houston DO    aspirin chewable tablet 81 mg, 81 mg, Oral, Daily, 81 mg at 05/25/24 0841 **OR** aspirin suppository 300 mg, 300 mg, Rectal, Daily, Mal Houston DO    atorvastatin (LIPITOR) tablet 80 mg, 80 mg, Oral, Nightly, Mal Houston DO    finasteride (PROSCAR) tablet 5 mg, 5 mg, Oral, Daily, Mal Houston DO, 5 mg at 05/25/24 0842    HYDROcodone-acetaminophen (NORCO) 5-325 MG per tablet 1 tablet, 1 tablet, Oral, Q8H PRN, Mal Houston DO    ondansetron (ZOFRAN) injection 4 mg, 4 mg, Intravenous, Q6H PRN, Mal Houston DO    sodium chloride 0.9 % flush 10 mL, 10 mL, Intravenous, Q12H, Mal Houston DO, 10 mL at 05/25/24 0842    sodium chloride 0.9 % flush 10 mL, 10 mL, Intravenous, PRN, Mal Houston DO    sodium chloride 0.9 % infusion 40 mL, 40 mL, Intravenous, PRN, Mal Houston DO    sodium chloride 0.9 % infusion, 75 mL/hr, Intravenous, Continuous, Kincaid, Hannah, APRN, Last Rate: 75 mL/hr at 05/25/24 1024, 75 mL/hr at 05/25/24 1024    tamsulosin (FLOMAX) 24 hr capsule 0.4 mg, 0.4 mg, Oral, Daily, Mal Houston DO     Allergies:  Allergies   Allergen Reactions    Demerol [Meperidine] Hallucinations       Social Hx:  Social History     Socioeconomic History    Marital status:     Tobacco Use    Smoking status: Never    Smokeless tobacco: Never   Vaping Use    Vaping status: Never Used   Substance and Sexual Activity    Alcohol use: Yes     Comment: occasional    Drug use: Never    Sexual activity: Defer       Family Hx:  Family History   Problem Relation Age of Onset    Sudden death Neg Hx     Urolithiasis Neg Hx      Physical Examination:   Vital Signs:  Vitals:    05/25/24 0407 05/25/24 0838 05/25/24 0930 05/25/24 1115   BP: 138/88 167/95 166/95 159/85   BP Location: Right arm Right arm Left arm Left arm   Patient Position: Lying Sitting Lying Lying   Pulse: 68 74 74 81   Resp: 18 16 16 16   Temp: 97.8 °F (36.6 °C) 97.9 °F (36.6 °C)  97.8 °F (36.6 °C)   TempSrc: Oral Oral  Oral   SpO2: 93% 98% 99% 98%   Weight:       Height:           General Exam:  Head:  Normocephalic, atraumatic  HEENT:  Neck supple  Fundoscopic Exam:  No signs of disc edema  CVS:  Regular rate and rhythm.  No murmurs  Carotid Examination:  No bruits  Lungs:  Clear to auscultation  Abdomen:  Nontender, Nondistended  Extremities:  No signs of peripheral edema  Skin:  No rashes    Neurologic Exam:    Mental Status:    -Awake, Alert, Oriented X 3  -No word finding difficulties  -No aphasia  -No dysarthria  -Follows simple and complex commands    CN II:  Visual fields full.  Pupils equally reactive to light  CN III, IV, VI:  Extraocular Muscles full with no signs of nystagmus  CN V:  Facial sensory is symmetric with no asymmetries.  CN VII:  Facial motor symmetric  CN VIII:  Gross hearing intact bilaterally  CN IX:  Palate elevates symmetrically  CN X:  Palate elevates symmetrically  CN XI:  Shoulder shrug symmetric  CN XII:  Tongue is midline on protrusion    Motor: (strength out of 5:  1= minimal movement, 2 = movement in plane of gravity, 3 = movement against gravity, 4 = movement against some resistance, 5 = full strength)    -Right Upper Ext: Proximal: 5 Distal: 5  -Left Upper Ext: Proximal: 5 Distal: 5    -Right  "Lower Ext: Proximal: 5 Distal: 5  -Left Lower Ext: Proximal: 5 Distal: 5    DTR:  -Right   Biceps: 2+ Triceps: 2+ Brachioradialis: 2+   Patella: 2+ Ankle: 2+ Neg Babinski  -Left   Biceps: 2+ Triceps: 2+ Brachioradialis: 2+   Patella: 2+ Ankle: 2+ Neg Babinski    Sensory:  -Intact to light touch, pinprick, temperature, pain, and proprioception    Coordination:  -Finger to nose intact  -Heel to shin intact  -No ataxia    Gait  -Malee unsteady on his feet    Recent Diagnostics:   Laboratory Results:   - Reviewed in EMR  Lab Results   Component Value Date    GLUCOSE 88 05/25/2024    CALCIUM 10.6 (H) 05/25/2024     05/25/2024    K 3.9 05/25/2024    CO2 28.0 05/25/2024     05/25/2024    BUN 13 05/25/2024    CREATININE 1.01 05/25/2024    BCR 12.9 05/25/2024    ANIONGAP 9.0 05/25/2024     Lab Results   Component Value Date    WBC 7.62 05/24/2024    HGB 14.7 05/24/2024    HCT 43.7 05/24/2024    MCV 89.0 05/24/2024     05/24/2024     No results found for: \"PTT\", \"INR\"  Lab Results   Component Value Date    TRIG 92 05/25/2024    HDL 35 (L) 05/25/2024     (H) 05/25/2024     Lab Results   Component Value Date    HGBA1C 5.10 05/25/2024       Imaging Results:  Imaging Results (Last 24 Hours)       Procedure Component Value Units Date/Time    CT Angiogram Carotids [[id number]] Resulted: 05/25/24 1123     Updated: 05/25/24 1138          Assessment & Plan:   69-year-old male was admitted to the hospital with any acute left cerebellar stroke per outside MRI report.  Will attempt to seek those results and possibly important to the system here.  Will add on CT angiogram head and neck and echocardiogram.  LDL and A1c.  The patient was not on antiplatelets at home.  Will do dual therapy for 3 weeks and then 81 mg aspirin daily thereafter.  Lipitor 80 mg daily.  Okay to normalize systolic blood pressure as he has been symptomatic for at least 2 days.  Maintain euglycemia and normothermia.  PT OT and speech " evaluation.  Echocardiogram with bubble study.    Leodan Soliman MD  05/25/24  11:54 CDT

## 2024-05-26 ENCOUNTER — APPOINTMENT (OUTPATIENT)
Dept: CT IMAGING | Facility: HOSPITAL | Age: 69
DRG: 066 | End: 2024-05-26
Payer: MEDICARE

## 2024-05-26 ENCOUNTER — READMISSION MANAGEMENT (OUTPATIENT)
Dept: CALL CENTER | Facility: HOSPITAL | Age: 69
End: 2024-05-26
Payer: MEDICARE

## 2024-05-26 VITALS
HEIGHT: 67 IN | RESPIRATION RATE: 16 BRPM | SYSTOLIC BLOOD PRESSURE: 155 MMHG | OXYGEN SATURATION: 98 % | HEART RATE: 96 BPM | WEIGHT: 192.2 LBS | BODY MASS INDEX: 30.17 KG/M2 | DIASTOLIC BLOOD PRESSURE: 95 MMHG | TEMPERATURE: 97.9 F

## 2024-05-26 LAB
ANION GAP SERPL CALCULATED.3IONS-SCNC: 6 MMOL/L (ref 5–15)
BUN SERPL-MCNC: 12 MG/DL (ref 8–23)
BUN/CREAT SERPL: 13.2 (ref 7–25)
CALCIUM SPEC-SCNC: 11 MG/DL (ref 8.6–10.5)
CHLORIDE SERPL-SCNC: 105 MMOL/L (ref 98–107)
CO2 SERPL-SCNC: 29 MMOL/L (ref 22–29)
CREAT SERPL-MCNC: 0.91 MG/DL (ref 0.76–1.27)
EGFRCR SERPLBLD CKD-EPI 2021: 90.1 ML/MIN/1.73
GLUCOSE SERPL-MCNC: 88 MG/DL (ref 65–99)
POTASSIUM SERPL-SCNC: 3.9 MMOL/L (ref 3.5–5.2)
SODIUM SERPL-SCNC: 140 MMOL/L (ref 136–145)

## 2024-05-26 PROCEDURE — 80048 BASIC METABOLIC PNL TOTAL CA: CPT | Performed by: NURSE PRACTITIONER

## 2024-05-26 PROCEDURE — 70450 CT HEAD/BRAIN W/O DYE: CPT

## 2024-05-26 PROCEDURE — 70496 CT ANGIOGRAPHY HEAD: CPT

## 2024-05-26 PROCEDURE — 99233 SBSQ HOSP IP/OBS HIGH 50: CPT | Performed by: CLINICAL NURSE SPECIALIST

## 2024-05-26 PROCEDURE — 25510000001 IOPAMIDOL PER 1 ML: Performed by: INTERNAL MEDICINE

## 2024-05-26 RX ORDER — AMLODIPINE BESYLATE 10 MG/1
10 TABLET ORAL
Qty: 30 TABLET | Refills: 0 | Status: SHIPPED | OUTPATIENT
Start: 2024-05-27 | End: 2024-06-26

## 2024-05-26 RX ORDER — ATORVASTATIN CALCIUM 80 MG/1
80 TABLET, FILM COATED ORAL NIGHTLY
Qty: 30 TABLET | Refills: 0 | Status: SHIPPED | OUTPATIENT
Start: 2024-05-26 | End: 2024-06-25

## 2024-05-26 RX ORDER — ASPIRIN 81 MG/1
81 TABLET, CHEWABLE ORAL DAILY
Start: 2024-05-27

## 2024-05-26 RX ORDER — CLOPIDOGREL BISULFATE 75 MG/1
75 TABLET ORAL DAILY
Qty: 19 TABLET | Refills: 0 | Status: SHIPPED | OUTPATIENT
Start: 2024-05-27 | End: 2024-06-15

## 2024-05-26 RX ADMIN — Medication 10 ML: at 08:06

## 2024-05-26 RX ADMIN — ASPIRIN 81 MG: 81 TABLET, CHEWABLE ORAL at 08:06

## 2024-05-26 RX ADMIN — FINASTERIDE 5 MG: 5 TABLET, FILM COATED ORAL at 08:06

## 2024-05-26 RX ADMIN — CLOPIDOGREL BISULFATE 75 MG: 75 TABLET, FILM COATED ORAL at 08:06

## 2024-05-26 RX ADMIN — AMLODIPINE BESYLATE 5 MG: 5 TABLET ORAL at 08:06

## 2024-05-26 RX ADMIN — IOPAMIDOL 100 ML: 755 INJECTION, SOLUTION INTRAVENOUS at 13:12

## 2024-05-26 NOTE — PROGRESS NOTES
Neurology Progress Note      Chief Complaint:  f/u left cerebellar stroke  Length of Stay:  2   Subjective     Subjective:  Patient sitting on edge of bed. Wife at bedside. Patient states he is back to baseline. He denies HA. OT/PT has evaluated patient and ok to dc home with assist. MRI done at outside facility and no report available. Will repeat CT head today. Patient does take flomax so will check orthostatic BP for completeness.     Background:    Patient had syncopal episode while on the toilet on 5/12/2024. He did strike his head and suffered facial abrasions. He had some left and tingling at that time. CT head negative and T CS showed multilevel spinal stenosis severe on left C3-4. He was d/c home. He continued to have near syncopal episodes and could feel them come over him like a wave of heat from his legs upward. . He saw his PCP who ordered MRI brain which was done at [facility]. There is no report for this provider to view but was shown to Dr. Houston on day of admission and report showed subacute to acute punctate right cerebellar stroke. His PCP sent him to the ED for evaluation and admission. CTA neck showed no significant stenosis. Repeat CT head 6/26/2EKG sinus rhythm with PVCs. TTE no PFO or significant valvular abnormalities. Patient was started on DUAP Plavix 75 mg and ASA 81 mg daily for 21 days and then ASA 81 mg daily.          Medications:  Current Facility-Administered Medications   Medication Dose Route Frequency Provider Last Rate Last Admin    acetaminophen (TYLENOL) tablet 650 mg  650 mg Oral Q6H PRN Mal Houston DO        amLODIPine (NORVASC) tablet 5 mg  5 mg Oral Q24H Hannah Kincaid APRN   5 mg at 05/26/24 0806    aspirin chewable tablet 81 mg  81 mg Oral Daily Mal Houston DO   81 mg at 05/26/24 0806    Or    aspirin suppository 300 mg  300 mg Rectal Daily Mal Houston DO        atorvastatin (LIPITOR) tablet 80 mg  80 mg Oral Nightly Mal Houston DO   80  mg at 05/25/24 2100    clopidogrel (PLAVIX) tablet 75 mg  75 mg Oral Daily Leodan Soliman MD   75 mg at 05/26/24 0806    finasteride (PROSCAR) tablet 5 mg  5 mg Oral Daily Mal Houston DO   5 mg at 05/26/24 0806    HYDROcodone-acetaminophen (NORCO) 5-325 MG per tablet 1 tablet  1 tablet Oral Q8H PRN Mal Houston DO        ondansetron (ZOFRAN) injection 4 mg  4 mg Intravenous Q6H PRN Mal Houston DO        sodium chloride 0.9 % flush 10 mL  10 mL Intravenous Q12H Mal Houston DO   10 mL at 05/26/24 0806    sodium chloride 0.9 % flush 10 mL  10 mL Intravenous PRN Mal Houston DO        sodium chloride 0.9 % infusion 40 mL  40 mL Intravenous PRN Mal Houston DO        tamsulosin (FLOMAX) 24 hr capsule 0.4 mg  0.4 mg Oral Nightly Wilton Guzmán MD   0.4 mg at 05/25/24 2124             Objective      Vital Signs  Temp:  [97.8 °F (36.6 °C)-98.3 °F (36.8 °C)] 97.9 °F (36.6 °C)  Heart Rate:  [73-97] 96  Resp:  [16-18] 16  BP: (140-163)/() 154/94        Pertinent Neuro Exam:    Neurologic Exam:    Mental Status:    -Alert, Oriented X 3  -No word-finding difficulties  -No aphasia  -No dysarthria  -Follows simple and complex commands    CN II:  Visual fields full.  Pupils equally reactive to light  CN III, IV, VI:  Extraocular Muscles full with no signs of nystagmus  CN V:  Facial sensory is symmetric with no asymmetries.  CN VII:  Facial motor symmetric  CN VIII:  Gross hearing intact bilaterally  CN IX:  Palate elevates symmetrically  CN X:  Palate elevates symmetrically  CN XI:  Shoulder shrug symmetric  CN XII:  Tongue protrudes to midline  Motor: (strength out of 5:  1= minimal movement, 2 = movement in plane of gravity, 3 = movement against gravity, 4 = movement against some resistance, 5 = full strength)    -Right Upper Ext: Proximal: 5 Distal: 5  -Left Upper Ext: Proximal: 5 Distal: 5    -Right Lower Ext: Proximal: 5 Distal: 5  -Left Lower Ext: Proximal:  5 Distal: 5    DTR:  -Right   Bicep: 2+ Tricep: 2+ Brachoradialis: 2+   Patella: 2+ Ankle: 2+ Neg Babinski  -Left   Bicep: 2+ Tricep: 2+ Brachoradialis: 2+   Patella: 2+ Ankle: 2+ Neg Babinski    Sensory:  -Intact to light touch, pinprick, temperature, pain, and proprioception    Coordination:  -Finger to nose intact  -Heel to shin intact      Gait  -No signs of ataxia  -ambulates unassisted  Last nurse assessment:     1a. Level of Consciousness: 0-->Alert, keenly responsive  1b. LOC Questions: 0-->Answers both questions correctly  1c. LOC Commands: 0-->Performs both tasks correctly  2. Best Gaze: 0-->Normal  3. Visual: 0-->No visual loss  4. Facial Palsy: 0-->Normal symmetrical movements  5a. Motor Arm, Left: 0-->No drift, limb holds 90 (or 45) degrees for full 10 secs  5b. Motor Arm, Right: 0-->No drift, limb holds 90 (or 45) degrees for full 10 secs  6a. Motor Leg, Left: 0-->No drift, leg holds 30 degree position for full 5 secs  6b. Motor Leg, Right: 0-->No drift, leg holds 30 degree position for full 5 secs  7. Limb Ataxia: 0-->Absent  8. Sensory: 0-->Normal, no sensory loss  9. Best Language: 0-->No aphasia, normal  10. Dysarthria: 0-->Normal  11. Extinction and Inattention (formerly Neglect): 0-->No abnormality    Total (NIH Stroke Scale): 0       Results Review:      Labs:  Result Review:  I have personally reviewed the results from the time of this admission to 5/26/2024 14:01 CDT and agree with these findings:  []  Laboratory list / accordion  [x]  Microbiology  [x]  Radiology  [x]  EKG/Telemetry   [x]  Cardiology/Vascular   []  Pathology  []  Old records  []  Other:  Most notable findings include:      Imaging:  CT Angiogram Head    Result Date: 5/26/2024   No large vessel flow-limiting stenosis or aneurysm.   This report was signed and finalized on 5/26/2024 1:35 PM by Neal Anguiano.      CT Head Without Contrast    Result Date: 5/26/2024  Impression:   No acute intracranial abnormality.  This report  was signed and finalized on 5/26/2024 1:30 PM by Neal Anguiano.      CT Angiogram Carotids    Result Date: 5/25/2024  Impression:  Mild atherosclerosis without a flow-limiting stenosis or significant narrowing.  Partially visualized 3.6 cm fluid attenuating structure in the left anterior mediastinum. Recommend further evaluation with nonemergent CT chest.  This report was signed and finalized on 5/25/2024 3:19 PM by Neal Anguiano.      CT Facial Bones Without Contrast    Result Date: 5/21/2024   1.  No definite evidence of an acute fracture on this examination.  2.  Mild multifocal paranasal sinus disease as discussed above. There is also anterior nasal septal deviation to the LEFT.    This report was signed and finalized on 5/21/2024 1:22 PM by Dr. Osvaldo Harrison MD.      CT Head Without Contrast    Result Date: 5/21/2024  1. No acute intracranial abnormality. 2. No evidence of skull fracture. 3. Chronic sinusitis.                 This report was signed and finalized on 5/21/2024 1:17 PM by Dr. Subha Beltran MD.      CT Cervical Spine Without Contrast    Result Date: 5/21/2024  1. No acute fracture or malalignment. 2. Greatest spinal canal stenosis appears moderate at C3-4. 3. Multilevel foraminal stenosis, severe at LEFT C3-4. Other level-by-level findings as above.  This report was signed and finalized on 5/21/2024 1:14 PM by Dr Maude Salazar MD.      XR Chest 1 View    Result Date: 5/21/2024  1. No acute cardiopulmonary findings. 2. There is a fragmented appearance of the LEFT distal acromion. Appearance favors a chronic or postoperative etiology. Correlate with patient's symptoms.  This report was signed and finalized on 5/21/2024 12:24 PM by Dr Maude Salazar MD.      Results for orders placed during the hospital encounter of 05/24/24    Adult Transthoracic Echo Complete W/ Cont if Necessary Per Protocol    Interpretation Summary    Left ventricular systolic function is mildly decreased. Estimated  left ventricular EF = 50%    The following left ventricular wall segments are hypokinetic: mid inferior, mid inferoseptal and mid anteroseptal. The following left ventricular wall segments are akinetic: basal inferior and basal inferoseptal.    Estimated right ventricular systolic pressure from tricuspid regurgitation is normal (<35 mmHg).    Normal size and function of the right ventricle.    No significant (worse than mild) valvular pathology.    Saline test results are negative.    No previous exams available for comparison.       Assessment/Plan   Patient had syncopal episode while on the toilet on 5/12/2024. He did strike his head and suffered facial abrasions. He had some left and tingling at that time. CT head negative and T CS showed multilevel spinal stenosis severe on left C3-4. He was d/c home. He continued to have near syncopal episodes and could feel them come over him like a wave of heat from his legs upward. . He saw his PCP who ordered MRI brain which was done at [facility]. There is no report for this provider to view but was shown to Dr. Houston on day of admission and report showed subacute to acute punctate right cerebellar stroke. His PCP sent him to the ED for evaluation and admission. CTA neck showed no significant stenosis. Repeat CT head 6/26/2EKG sinus rhythm with PVCs. TTE no PFO or significant valvular abnormalities. Patient was started on DUAP Plavix 75 mg and ASA 81 mg daily for 21 days and then ASA 81 mg daily.  PMH includes arthritis, kidney stones and urinary incontinence . IV TNK not considered as last known well unknown and out of window.       Hospital Problem List      Acute CVA (cerebrovascular accident)    Hypercalcemia    Elevated blood-pressure reading without diagnosis of hypertension    Hyperlipidemia    Class 1 obesity with body mass index (BMI) of 30.0 to 30.9 in adult    Impression:  Acute punctate right cerebellar stroke.  HTN  HLD.   Near syncope    Plan:   CT  head today.  CTA head today.  Orthostatic BP. Documented above and negative.  SBP goal less than 140.  Lipitor 80 mg daily.  ASA 81 mg and Plavix 75 mg daily for 21 days then ASA 81 mg daily.  No therapy needs at discharge.  No driving for 90 days.   F/u neurology in 4 weeks.  Ok to d/c from neuro stand point.  Would like to see if can get MRI report from OSH but is a holiday.       Medical Decision Making    Number/Complexity of Problems  Moderate  1 undiagnosed new problem with uncertain prognosis -   1 acute illness with systemic symptoms -   High  1 acute or chronic illness that pose a threat to life/body function -   high     MDM Data  Moderate - 1/3 categories  Extensive - 2/3 categories    Category 1: 3 of the following  Review of external notes  Review of results  Ordering of each unique test  Independent historian  Category 2:  Independent interpretation of test (ex: imaging)  Category 3:  Discussion of management with another provider    extensive     Treatment Plan  Moderate - Prescription Drug management  High  Drug therapy requiring intensive monitoring for toxicity  Decision regarding hospitalization or escalation of care  De-escalate care/DNR decisions  high Roselia Cuevas, APRN  05/26/24  13:49 CDT

## 2024-05-26 NOTE — PLAN OF CARE
Problem: Adult Inpatient Plan of Care  Goal: Plan of Care Review  Outcome: Ongoing, Progressing  Flowsheets (Taken 5/26/2024 0605)  Progress: no change  Outcome Evaluation: Pt is A&Ox4. NIH-0. No neuro changes noted. ad issac. No complaints of any dizziness during the night. VSS. Safety maintained.

## 2024-05-26 NOTE — PROGRESS NOTES
I personally seen and examined the patient  Patient was seated comfortably on the edge of bed  He is not in any distress  He is with his wife  Their first question was can they go home. Wife expressed comfort level to take him home.  I was joined subsequently by CHANG Veloz with neurology service.  Before we I came to this patient's room, I had a discussion with Roselia and CHANG Young.  At any rate, patient is doing better  He is hemodynamically stable  Me and CHANG Veloz reviewed the physical therapist note.  CHANG Veloz recommends further imaging study which was done today.  Head CT scan did not show acute intracranial abnormality  CT angiogram of the head no large vessel limiting flow stenosis or aneurysm  CT angiogram of the neck indicates mild atherosclerosis without flow-limiting stenosis or significant narrowing  Reading through the body of the report for the CT angiogram of the carotids the lung apices were described clear but there is a partially visualized 3.6 cm fluid attenuation within structure in the left anterior mediastinum.  This has been put to my attention as well by Dr. Palomares.  CHANG Young made patient aware of the finding.  See details of documentation below for details.    Electronically signed by Wilton Guzmán MD, 05/26/24, 6:06 PM CDT.

## 2024-05-26 NOTE — DISCHARGE SUMMARY
[facility] Medicine Services  DISCHARGE SUMMARY       Date of Admission: 5/24/2024  Date of Discharge:  5/26/2024  Primary Care Physician: Sean Shaffer DO    Presenting Problem/History of Present Illness:  Near syncope    Final Discharge Diagnoses:  Active Hospital Problems    Diagnosis     **Acute CVA (cerebrovascular accident)     Elevated blood-pressure reading without diagnosis of hypertension     Hyperlipidemia     Class 1 obesity with body mass index (BMI) of 30.0 to 30.9 in adult        Consults: Dr. Soliman with neurology.    Procedures Performed: none.    Pertinent Test Results:   Results for orders placed during the hospital encounter of 05/24/24    Adult Transthoracic Echo Complete W/ Cont if Necessary Per Protocol    Interpretation Summary    Left ventricular systolic function is mildly decreased. Estimated left ventricular EF = 50%    The following left ventricular wall segments are hypokinetic: mid inferior, mid inferoseptal and mid anteroseptal. The following left ventricular wall segments are akinetic: basal inferior and basal inferoseptal.    Estimated right ventricular systolic pressure from tricuspid regurgitation is normal (<35 mmHg).    Normal size and function of the right ventricle.    No significant (worse than mild) valvular pathology.    Saline test results are negative.    No previous exams available for comparison.      Imaging Results (All)       Procedure Component Value Units Date/Time    CT Angiogram Head [[id number]] Collected: 05/26/24 1330     Updated: 05/26/24 1338    Narrative:      EXAM: CT ANGIOGRAM HEAD- - 5/26/2024 12:04 PM     HISTORY: left cerebellar stroke; I63.9-Cerebral infarction, unspecified;  R55-Syncope and collapse       DOSE LENGTH PRODUCT: 911.98 mGy.cm mGy cm. Automated exposure control  was also utilized to decrease patient radiation dose.     COMPARISON: None.     TECHNIQUE: CTA head was performed with  intravenous contrast. 3D  postprocessing, including MIPs, performed and images saved to PACS. AI  ANALYSIS OF LVO WAS UTILIZED.  Grading of carotid stenosis performed  with NASCET criteria.     FINDINGS:     CTA HEAD:     Distal ICAs: No flow-limiting stenosis. Mild cavernous atherosclerosis.     MCAs: No flow-limiting stenosis.     ACAs: No flow-limiting stenosis.     Intracranial vertebrals: No flow-limiting stenosis. Mild  atherosclerosis.     Basilar: No flow-limiting stenosis.     PCAs: No flow-limiting stenosis.        OTHER:     Brain: No gross abnormality.     Orbits: No gross abnormality.     Soft tissues: No gross abnormality.     Osseous structures: No acute or suspicious osseous finding.     Other: None.       Impression:         No large vessel flow-limiting stenosis or aneurysm.        This report was signed and finalized on 5/26/2024 1:35 PM by Neal Anguiano.       CT Head Without Contrast [[id number]] Collected: 05/26/24 1324     Updated: 05/26/24 1333    Narrative:      Exam: CT HEAD WO CONTRAST- 5/26/2024 12:04 PM     HISTORY: left cerebellar stroke; I63.9-Cerebral infarction, unspecified;  R55-Syncope and collapse       DOSE LENGTH PRODUCT: 911.98 mGy.cm mGy cm. Automated exposure control  was also utilized to decrease patient radiation dose.     Technique:  Helically acquired CT of the brain without IV contrast was performed.  Sagittal and coronal reformations are also provided for review. Soft  tissue and bone kernels are available for interpretation.     Comparison: None.     Findings:     Ventricles and extra-axial CSF spaces are normal in size.     No intraparenchymal or extra-axial hemorrhage.     Gray-white matter differentiation is preserved.     Orbits are grossly unremarkable. Mild paranasal sinus mucosal  thickening. Mastoid air cells are grossly clear.     No suspicious calvarial or extracranial soft tissue abnormality.     Other:None.       Impression:      Impression:       No acute  intracranial abnormality.     This report was signed and finalized on 5/26/2024 1:30 PM by Neal Anguiano.       CT Angiogram Carotids [[id number]] Collected: 05/25/24 1506     Updated: 05/25/24 1522    Narrative:      Exam: CT ANGIOGRAM CAROTIDS- 5/25/2024 10:23 AM     HISTORY: CVA; I63.9-Cerebral infarction, unspecified; R55-Syncope and  collapse     DOSE LENGTH PRODUCT: 331.5 mGy.cm mGy cm. Automated exposure control was  also utilized to decrease patient radiation dose.     Technique: Axial CT angiogram of the neck after the uneventful  administration of Isovue IV contrast. Sagittal and coronal reformations  were also provided for review. 3D images were created on an independent  workstation to better evaluate potential vascular abnormalities. NASCET  criteria was used for grading of carotid artery stenosis.     Comparison: Cervical spine CT 5/21/2024..     Findings:     Right carotid system: Mild atherosclerosis without a flow-limiting  stenosis     Left carotid system: Mild atherosclerosis without a flow-limiting  stenosis.     Right vertebral: Mild atherosclerosis without a flow-limiting stenosis.     Left vertebral: Mild atherosclerosis without a flow-limiting stenosis.     OTHER:     Lung apices: Lungs are clear. Partially visualized 3.6 cm fluid  attenuating structure in the left anterior mediastinum (image 129).     Soft tissues: No gross abnormality.     Osseous structures: No acute or suspicious osseous finding. Mild ethmoid  air cell mucosal thickening.     Other: None.       Impression:      Impression:     Mild atherosclerosis without a flow-limiting stenosis or significant  narrowing.     Partially visualized 3.6 cm fluid attenuating structure in the left  anterior mediastinum. Recommend further evaluation with nonemergent CT  chest.     This report was signed and finalized on 5/25/2024 3:19 PM by Neal Anguiano.             LAB RESULTS:      Lab 05/24/24  1804 05/21/24  1235   WBC 7.62 6.87    HEMOGLOBIN 14.7 14.6   HEMATOCRIT 43.7 44.7   PLATELETS 239 249   NEUTROS ABS 4.63 5.39   IMMATURE GRANS (ABS) 0.02 0.03   LYMPHS ABS 2.16 1.12   MONOS ABS 0.62 0.28   EOS ABS 0.13 0.00   MCV 89.0 89.9   D DIMER QUANT  --  0.28         Lab 05/26/24  0346 05/25/24  1226 05/25/24  0528 05/24/24  1804 05/21/24  1235   SODIUM 140  --  141 141 140   POTASSIUM 3.9  --  3.9 4.1 4.5   CHLORIDE 105  --  104 104 105   CO2 29.0  --  28.0 31.0* 29.0   ANION GAP 6.0  --  9.0 6.0 6.0   BUN 12  --  13 12 15   CREATININE 0.91  --  1.01 1.01 0.92   EGFR 90.1  --  79.5 79.5 88.9   GLUCOSE 88  --  88 96 108*   CALCIUM 11.0*  --  10.6* 11.1* 11.2*   MAGNESIUM  --   --   --  2.3 2.2   HEMOGLOBIN A1C  --   --  5.10  --   --    TSH  --  0.885  --   --   --          Lab 05/21/24  1235   TOTAL PROTEIN 7.4   ALBUMIN 4.5   GLOBULIN 2.9   ALT (SGPT) 13   AST (SGOT) 15   BILIRUBIN 0.3   ALK PHOS 69         Lab 05/21/24  1455 05/21/24  1235   HSTROP T 8 9         Lab 05/25/24  1226 05/25/24  0528   CHOLESTEROL  --  188   LDL CHOL 132* 136*   HDL CHOL  --  35*   TRIGLYCERIDES  --  92             Brief Urine Lab Results       None          Microbiology Results (last 10 days)       ** No results found for the last 240 hours. **            Hospital Course:   Mr. Beltran is a 69-year-old male who presented to [facility] on 5/24 due to near syncopal events.  Patient has had numerous syncopal spells over the past several days.  He becomes lightheaded and dizzy with position changes.  He explains 1 spell in which he lost consciousness.  No tonic-clonic activity, incontinence or tongue biting.  Within 1-2 minutes he woke up and was alert and oriented.  No postictal state.  He was seen by his primary care provider on 5/24 and had an MRI brain at [facility] radiology at the request of his PCP that showed possible acute/subacute left cerebellar stroke. He denies any focal limb weakness, numbness, chest pain, shortness of breath.  He was not on  antiplatelets at home.    Neurology consulted for acute right cerebellar stroke.  Neurology recommends dual antiplatelet therapy with aspirin 81 mg and Plavix 75 mg for 3 weeks and then aspirin 81 mg daily thereafter. , with goal LDL less than 70.  High intensity statin.  Repeat head CT today showed no acute intracranial normality.  CT angiogram head showed no large vessel flow-limiting stenosis or aneurysm.  No driving for 90 days.  Neurology recommends 14-day Holter monitor. He is okay for discharge from neurology standpoint with follow-up in 4 weeks.     Results for orders placed during the hospital encounter of 05/24/24    Adult Transthoracic Echo Complete W/ Cont if Necessary Per Protocol    Interpretation Summary    Left ventricular systolic function is mildly decreased. Estimated left ventricular EF = 50%    The following left ventricular wall segments are hypokinetic: mid inferior, mid inferoseptal and mid anteroseptal. The following left ventricular wall segments are akinetic: basal inferior and basal inferoseptal.    Estimated right ventricular systolic pressure from tricuspid regurgitation is normal (<35 mmHg).    Normal size and function of the right ventricle.    No significant (worse than mild) valvular pathology.    Saline test results are negative.    No previous exams available for comparison.     Orthostatic blood pressure negative.     Blood pressure elevated with systolic 170s.  He does not take blood pressure medicine.  Outpatient blood pressure goal 140/80.  Continue amlodipine 10 mg daily.  Recommend to check blood pressure at home and keep a log of readings to bring to follow-up appointment with Dr. Shaffer.     PT/OT/SLP -no therapy needs.    Partially visualized 3.6 cm fluid attenuating structure in the left   anterior mediastinum. Recommend further evaluation with nonemergent CT   chest.  Discussed findings with patient and Ms. Beltran -she plans to discuss further with patient's  "primary care provider Dr. Shaffer when seen in 1 week hospital follow-up.     Patient states he is feeling well and is back to baseline.  He is eager for discharge home.  He has reached maximum benefit of hospitalization.  He is medically stable and appropriate for discharge today.    Physical Exam on Discharge:  /95 (BP Location: Left arm, Patient Position: Standing)   Pulse 96   Temp 97.9 °F (36.6 °C) (Oral)   Resp 16   Ht 170.2 cm (67\")   Wt 87.2 kg (192 lb 3.2 oz)   SpO2 98%   BMI 30.10 kg/m²   Physical Exam  Vitals reviewed.   Constitutional:       General: He is not in acute distress.     Appearance: He is not toxic-appearing.      Comments: Sitting up in chair. Spouse at bedside.   HENT:      Head: Normocephalic and atraumatic.      Mouth/Throat:      Mouth: Mucous membranes are moist.      Pharynx: Oropharynx is clear.   Eyes:      Extraocular Movements: Extraocular movements intact.      Conjunctiva/sclera: Conjunctivae normal.      Pupils: Pupils are equal, round, and reactive to light.   Cardiovascular:      Rate and Rhythm: Normal rate and regular rhythm.      Pulses: Normal pulses.      Comments: Sinus 78-94  Pulmonary:      Effort: Pulmonary effort is normal. No respiratory distress.      Breath sounds: Normal breath sounds. No wheezing.   Abdominal:      General: Bowel sounds are normal. There is no distension.      Palpations: Abdomen is soft.      Tenderness: There is no abdominal tenderness.   Musculoskeletal:         General: No swelling or tenderness. Normal range of motion.      Cervical back: Normal range of motion and neck supple. No muscular tenderness.   Skin:     General: Skin is warm and dry.      Findings: No erythema or rash.   Neurological:      General: No focal deficit present.      Mental Status: He is alert and oriented to person, place, and time.      Cranial Nerves: No cranial nerve deficit.      Motor: No weakness.   Psychiatric:         Mood and Affect: Mood normal.   "       Behavior: Behavior normal.       Condition on Discharge: medically stable.    Discharge Disposition:  Home or Self Care    Discharge Medications:     Discharge Medications        New Medications        Instructions Start Date   amLODIPine 10 MG tablet  Commonly known as: NORVASC   10 mg, Oral, Every 24 Hours Scheduled   Start Date: May 27, 2024     aspirin 81 MG chewable tablet   81 mg, Oral, Daily, Obtain OTC   Start Date: May 27, 2024     atorvastatin 80 MG tablet  Commonly known as: LIPITOR   80 mg, Oral, Nightly      clopidogrel 75 MG tablet  Commonly known as: PLAVIX   75 mg, Oral, Daily   Start Date: May 27, 2024            Continue These Medications        Instructions Start Date   tamsulosin 0.4 MG capsule 24 hr capsule  Commonly known as: FLOMAX   1 capsule, Oral, Daily               Discharge Diet:   Diet Instructions       Diet: Regular/House Diet; Regular (IDDSI 7); Thin (IDDSI 0)      Discharge Diet: Regular/House Diet    Texture: Regular (IDDSI 7)    Fluid Consistency: Thin (IDDSI 0)            Activity at Discharge:   Activity Instructions       Activity as Tolerated              Follow-up Appointments:   1.  Follow-up with Dr. Shaffer in 1 week  2.  Follow-up with neurology in 4 weeks.    Test Results Pending at Discharge: none.    Electronically signed by EVER Tellez, 05/26/24, 15:29 CDT.    Time: 35 minutes.

## 2024-05-27 LAB
QT INTERVAL: 354 MS
QTC INTERVAL: 408 MS

## 2024-05-27 NOTE — OUTREACH NOTE
Prep Survey      Flowsheet Row Responses   [facility] facility patient discharged from? [facility]   Is LACE score < 7 ? No   Eligibility Readm Mgmt   Discharge diagnosis Acute CVA   Does the patient have one of the following disease processes/diagnoses(primary or secondary)? Stroke   Does the patient have Home health ordered? No   Is there a DME ordered? No   Prep survey completed? Yes            Lizbeth MILLARD - Registered Nurse

## 2024-05-31 ENCOUNTER — READMISSION MANAGEMENT (OUTPATIENT)
Dept: CALL CENTER | Facility: HOSPITAL | Age: 69
End: 2024-05-31
Payer: MEDICARE

## 2024-05-31 NOTE — OUTREACH NOTE
"Stroke Week 1 Survey      Flowsheet Row Responses   [facility] patient discharged from? [place]   Does the patient have one of the following disease processes/diagnoses(primary or secondary)? Stroke   Week 1 attempt successful? Yes   Call start time 1049   Call end time 1057   Discharge diagnosis Acute CVA   Person spoke with today (if not patient) and relationship Snow Beltran--Wife   Medication alerts for this patient Norvasc, Aspirin, Lipitor, Plavix   Meds reviewed with patient/caregiver? Yes   Is the patient having any side effects they believe may be caused by any medication additions or changes? No   Does the patient have all medications ordered at discharge? Yes   Is the patient taking all medications as directed (includes completed medication regime)? Yes   Comments regarding appointments PCP is setting up Neurology appt for patient.   Does the patient have a primary care provider?  Yes   Does the patient have an appointment with their PCP within 7 days of discharge? Yes   Comments regarding PCP PCP--Dr. Sean Shaffer--Pt saw PCP yesterday, 5/30/24 for hospital f/u.   Has the patient kept scheduled appointments due by today? Yes   Has home health visited the patient within 72 hours of discharge? N/A   Psychosocial issues? No   Does the patient require any assistance with activities of daily living such as eating, bathing, dressing, walking, etc.? No   Does the patient have any residual symptoms from stroke/TIA? Yes   Residual symptoms comments occasional word finding, but not severe per wife. \"probably normal for a 68 year old\".   Does the patient understand the diet ordered at discharge? Yes   Did the patient receive a copy of their discharge instructions? Yes   Nursing interventions Reviewed instructions with patient   What is the patient's perception of their health status since discharge? Returned to baseline/stable   Nursing interventions Nurse provided patient education   Is the patient/caregiver " able to teach back the risk factors for a stroke? High blood pressure-goal below 120/80, Diabetes, High Cholesterol, History of TIAs   Is the patient/caregiver able to teach back signs and symptoms related to disease process for when to call PCP? Yes   Is the patient/caregiver able to teach back signs and symptoms related to disease process for when to call 911? Yes   If the patient is a current smoker, are they able to teach back resources for cessation? Not a smoker   Is the patient/caregiver able to teach back the hierarchy of who to call/visit for symptoms/problems? PCP, Specialist, Home health nurse, Urgent Care, ED, 911 Yes   Additional teach back comments Wife states she is checking patient's blood pressure daily.   Is the patient able to teach back FAST for Stroke? B alance: Watch for sudden loss of balance, E yes: Check for vision loss, F ace: Look for an uneven smile, A rm: Check if one arm is weak, S peech: Listen for slurred speech, T darius: Call 9-1-1 right away   Week 1 call completed? Yes   Revoked No further contact(revokes)-requires comment   Is the patient interested in additional calls from an ambulatory ? No   Would this patient benefit from a Referral to [facility] Social Work? No   Graduated/Revoked comments Patient returned to baseline. Doing well, Eating well. PCP is arranging a CT Chest  for a finding while in the hospital. Patient is increasing his water intake daily. Wife denies any needs or concerns. Appreciative of the care they received at [facility].   Call end time 1057            Lian SPENCER - Registered Nurse

## 2024-06-06 ENCOUNTER — HOSPITAL ENCOUNTER (OUTPATIENT)
Dept: CARDIOLOGY | Facility: HOSPITAL | Age: 69
Discharge: HOME | End: 2024-06-06
Admitting: CLINICAL NURSE SPECIALIST
Payer: MEDICARE

## 2024-06-06 DIAGNOSIS — I63.9 CEREBROVASCULAR ACCIDENT (CVA), UNSPECIFIED MECHANISM: ICD-10-CM

## 2024-06-06 DIAGNOSIS — R55 NEAR SYNCOPE: ICD-10-CM

## 2024-06-06 PROCEDURE — 93246 EXT ECG>7D<15D RECORDING: CPT

## 2024-07-10 ENCOUNTER — OFFICE VISIT (OUTPATIENT)
Dept: NEUROLOGY | Age: 69
End: 2024-07-10
Payer: MEDICARE

## 2024-07-10 VITALS
DIASTOLIC BLOOD PRESSURE: 85 MMHG | WEIGHT: 190 LBS | SYSTOLIC BLOOD PRESSURE: 131 MMHG | HEART RATE: 77 BPM | HEIGHT: 67 IN | BODY MASS INDEX: 29.82 KG/M2

## 2024-07-10 DIAGNOSIS — R20.0 NUMBNESS: ICD-10-CM

## 2024-07-10 DIAGNOSIS — I63.9 ACUTE ISCHEMIC STROKE (HCC): Primary | ICD-10-CM

## 2024-07-10 DIAGNOSIS — R26.89 IMBALANCE: ICD-10-CM

## 2024-07-10 DIAGNOSIS — S09.90XS HEAD INJURY, SEQUELA: ICD-10-CM

## 2024-07-10 PROCEDURE — 99204 OFFICE O/P NEW MOD 45 MIN: CPT | Performed by: PSYCHIATRY & NEUROLOGY

## 2024-07-10 PROCEDURE — 1123F ACP DISCUSS/DSCN MKR DOCD: CPT | Performed by: PSYCHIATRY & NEUROLOGY

## 2024-07-10 PROCEDURE — 1036F TOBACCO NON-USER: CPT | Performed by: PSYCHIATRY & NEUROLOGY

## 2024-07-10 PROCEDURE — 3017F COLORECTAL CA SCREEN DOC REV: CPT | Performed by: PSYCHIATRY & NEUROLOGY

## 2024-07-10 PROCEDURE — G8427 DOCREV CUR MEDS BY ELIG CLIN: HCPCS | Performed by: PSYCHIATRY & NEUROLOGY

## 2024-07-10 PROCEDURE — G8419 CALC BMI OUT NRM PARAM NOF/U: HCPCS | Performed by: PSYCHIATRY & NEUROLOGY

## 2024-07-10 RX ORDER — ASPIRIN 81 MG/1
81 TABLET, CHEWABLE ORAL DAILY
COMMUNITY
Start: 2024-05-27

## 2024-07-10 RX ORDER — AMLODIPINE BESYLATE 10 MG/1
10 TABLET ORAL DAILY
COMMUNITY
Start: 2024-05-26

## 2024-07-10 NOTE — PROGRESS NOTES
Chief Complaint   Patient presents with    New Patient     Referred for headaches and CVA        John Cleary is a 69 y.o. year old male who is seen for evaluation of acute ischemic stroke.  The patient is here with his family and indicates that approximately a month ago he was sitting on the toilet and turned to his left.  He passed out and hit his head.  He did notice some numbness in the middle 3 fingers of the left and along with some imbalance after this.  He went to the ER with an unremarkable workup.  He was discharged and then had an MRI of the brain as an outpatient which was reported to have a possible subacute lacunar infarct in the right cerebellum.  The patient denies any previous history of stroke.  He was admitted back to [facility] and underwent further workup and saw neurology.  His 2D echocardiogram revealed ejection fraction of 50% with some left ventricular hypokinesis.  There was no evidence of a PFO.  There was evidence of an atrial septal aneurysm.  She is extended Holter monitor was relatively unremarkable without evidence of atrial fibrillation.  His CT of the head and neck no evidence of carotid stenosis or significant intracranial disease.  He is planning on seeing cardiology in the next few weeks.  He is on aspirin for stroke prophylaxis.  The patient does have a history of chronic nonmigraine is headaches since childhood but this have improved after taking blood pressure medications    Active Ambulatory Problems     Diagnosis Date Noted    Acute ischemic stroke (HCC) 07/10/2024    Numbness 07/10/2024    Head injury, sequela 07/10/2024     Resolved Ambulatory Problems     Diagnosis Date Noted    No Resolved Ambulatory Problems     Past Medical History:   Diagnosis Date    Enlarged prostate        No past surgical history on file.    No family history on file.    Allergies   Allergen Reactions    Demerol Hcl [Meperidine]        Social History     Socioeconomic History    Marital

## 2024-07-11 ENCOUNTER — TELEPHONE (OUTPATIENT)
Dept: CARDIOLOGY | Facility: CLINIC | Age: 69
End: 2024-07-11
Payer: MEDICARE

## 2024-07-11 NOTE — TELEPHONE ENCOUNTER
Caller: RUKHSANA BONILLA    Relationship to patient: Emergency Contact    Best call back number: [phone]      Type of visit: NEW PATIENT     Requested date: ASAP       Additional notes:PATIENT SEEN BY NEUROLOGY ON 7/10/24 AND THAT DOCTOR WANTS HIM TO BE SEEN SOONER THAN APPOINTMENT ON 8/27/24.

## 2024-08-22 ENCOUNTER — OFFICE VISIT (OUTPATIENT)
Dept: PULMONOLOGY | Facility: CLINIC | Age: 69
End: 2024-08-22
Payer: MEDICARE

## 2024-08-22 VITALS
WEIGHT: 199.2 LBS | BODY MASS INDEX: 31.27 KG/M2 | SYSTOLIC BLOOD PRESSURE: 126 MMHG | OXYGEN SATURATION: 97 % | DIASTOLIC BLOOD PRESSURE: 70 MMHG | HEIGHT: 67 IN | HEART RATE: 75 BPM

## 2024-08-22 DIAGNOSIS — J30.2 SEASONAL ALLERGIES: ICD-10-CM

## 2024-08-22 DIAGNOSIS — E66.9 OBESITY (BMI 30-39.9): ICD-10-CM

## 2024-08-22 DIAGNOSIS — R91.8 LUNG NODULES: Primary | ICD-10-CM

## 2024-08-22 RX ORDER — AMLODIPINE BESYLATE 10 MG/1
1 TABLET ORAL DAILY
COMMUNITY
Start: 2024-05-26

## 2024-08-22 NOTE — PATIENT INSTRUCTIONS
Obesity, Adult  Obesity is having too much body fat. Being obese means that your weight is more than what is healthy for you.   BMI (body mass index) is a number that explains how much body fat you have. If you have a BMI of 30 or more, you are obese.  Obesity can cause serious health problems, such as:  Stroke.  Coronary artery disease (CAD).  Type 2 diabetes.  Some types of cancer.  High blood pressure (hypertension).  High cholesterol.  Gallbladder stones.  Obesity can also contribute to:  Osteoarthritis.  Sleep apnea.  Infertility problems.  What are the causes?  Eating meals each day that are high in calories, sugar, and fat.  Drinking a lot of drinks that have sugar in them.  Being born with genes that may make you more likely to become obese.  Having a medical condition that causes obesity.  Taking certain medicines.  Sitting a lot (having a sedentary lifestyle).  Not getting enough sleep.  What increases the risk?  Having a family history of obesity.  Living in an area with limited access to:  Fine, recreation centers, or sidewalks.  Healthy food choices, such as grocery stores and farmers' markets.  What are the signs or symptoms?  The main sign is having too much body fat.  How is this treated?  Treatment for this condition often includes changing your lifestyle. Treatment may include:  Changing your diet. This may include making a healthy meal plan.  Exercise. This may include activity that causes your heart to beat faster (aerobic exercise) and strength training. Work with your doctor to design a program that works for you.  Medicine to help you lose weight. This may be used if you are not able to lose one pound a week after 6 weeks of healthy eating and more exercise.  Treating conditions that cause the obesity.  Surgery. Options may include gastric banding and gastric bypass. This may be done if:  Other treatments have not helped to improve your condition.  You have a BMI of 40 or higher.  You have  life-threatening health problems related to obesity.  Follow these instructions at home:  Eating and drinking    Follow advice from your doctor about what to eat and drink. Your doctor may tell you to:  Limit fast food, sweets, and processed snack foods.  Choose low-fat options. For example, choose low-fat milk instead of whole milk.  Eat five or more servings of fruits or vegetables each day.  Eat at home more often. This gives you more control over what you eat.  Choose healthy foods when you eat out.  Learn to read food labels. This will help you learn how much food is in one serving.  Keep low-fat snacks available.  Avoid drinks that have a lot of sugar in them. These include soda, fruit juice, iced tea with sugar, and flavored milk.  Drink enough water to keep your pee (urine) pale yellow.  Do not go on fad diets.  Physical activity  Exercise often, as told by your doctor. Most adults should get up to 150 minutes of moderate-intensity exercise every week.Ask your doctor:  What types of exercise are safe for you.  How often you should exercise.  Warm up and stretch before being active.  Do slow stretching after being active (cool down).  Rest between times of being active.  Lifestyle  Work with your doctor and a food expert (dietitian) to set a weight-loss goal that is best for you.  Limit your screen time.  Find ways to reward yourself that do not involve food.  Do not drink alcohol if:  Your doctor tells you not to drink.  You are pregnant, may be pregnant, or are planning to become pregnant.  If you drink alcohol:  Limit how much you have to:  0-1 drink a day for women.  0-2 drinks a day for men.  Know how much alcohol is in your drink. In the [place] one drink equals one 12 oz bottle of beer (355 mL), one 5 oz glass of wine (148 mL), or one 1½ oz glass of hard liquor (44 mL).  General instructions  Keep a weight-loss journal. This can help you keep track of:  The food that you eat.  How much exercise you  get.  Take over-the-counter and prescription medicines only as told by your doctor.  Take vitamins and supplements only as told by your doctor.  Think about joining a support group.  Pay attention to your mental health as obesity can lead to depression or self esteem issues.  Keep all follow-up visits.  Contact a doctor if:  You cannot meet your weight-loss goal after you have changed your diet and lifestyle for 6 weeks.  You are having trouble breathing.  Summary  Obesity is having too much body fat.  Being obese means that your weight is more than what is healthy for you.  Work with your doctor to set a weight-loss goal.  Get regular exercise as told by your doctor.  This information is not intended to replace advice given to you by your health care provider. Make sure you discuss any questions you have with your health care provider.  Document Revised: 07/26/2022 Document Reviewed: 07/26/2022  Elsevier Patient Education © 2024 [employer]

## 2024-08-22 NOTE — PROGRESS NOTES
Alena Lopez, EVER  [facility]  [address]            [place] [address]  Phone: [phone]  Fax: [phone]          Chief Complaint  lung nodules    Subjective     Austin Beltran presents to [facility] for appointment.  History of Present Illness  The patient presents for evaluation of lung nodules. He is accompanied by his wife.    He reports no history of lung issues, including childhood asthma. He does not experience shortness of breath or coughing. He has never used an inhaler and has not been tested for COVID-19 but has received all available vaccines.     He has a history of kidney stones, for which he takes Flomax. He occasionally experiences heartburn, which he manages with over-the-counter medication. He also mentions occasional choking, a condition his mother also had.    He suffers from severe seasonal allergies, particularly to dust, which he manages without medication. 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 He has never taken methotrexate, amiodarone, Macrodantin.    He has arthritis in his joints, but has not been diagnosed with rheumatoid arthritis. He has not been tested for sleep apnea and does not believe he has it. He sleeps well, although he sometimes wakes up early and struggles to fall back asleep.    He has a dog at home and has raised chickens for 23 years, but has never kept birds as pets.     He experienced a syncopal episode while using the restroom, during which he hit his head on the commode and broke his glasses. He has not had another episode in the past 2 to 3 months.    He has an upcoming appointment with Dr. Bautista in October 2024. He reports no issues with blood sugar levels.    SOCIAL HISTORY  He does not drink alcohol regularly, just maybe a drink on the weekend or something. He has never smoked  "anything.    FAMILY HISTORY  He denies any family history of lung problems. His parents passed away at 86 with a massive stroke.    Objective   Vital Signs:   /70   Pulse 75   Ht 170.2 cm (67\")   Wt 90.4 kg (199 lb 3.2 oz)   SpO2 97% Comment: RA  BMI 31.20 kg/m²        Physical Exam  Vitals reviewed.   Constitutional:       General: He is not in acute distress.     Appearance: He is well-developed. He is obese.   HENT:      Head: Normocephalic and atraumatic.   Eyes:      General: No scleral icterus.     Conjunctiva/sclera: Conjunctivae normal.      Pupils: Pupils are equal, round, and reactive to light.   Cardiovascular:      Rate and Rhythm: Normal rate.   Pulmonary:      Effort: Pulmonary effort is normal. No respiratory distress.      Breath sounds: Normal breath sounds. No wheezing or rales.   Musculoskeletal:         General: Normal range of motion.      Cervical back: Normal range of motion and neck supple.   Skin:     General: Skin is warm and dry.   Neurological:      Mental Status: He is alert and oriented to person, place, and time.   Psychiatric:         Behavior: Behavior normal.         Thought Content: Thought content normal.         Judgment: Judgment normal.          Result Review :  The following data was reviewed by: EVER Warner on 08/22/2024:    RADIOLOGY - SCAN - [facility] [place]--CT CHEST WO CONTRAST--06/14/24 (06/14/2024)       CT Chest Without Contrast Diagnostic (11/03/2023 17:07 EDT)   Impression    1.  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  2.  3 mm left lower lobe noncalcified nodule is likely benign.  No follow-up needed.  3.  Displaced fracture of the left clavicle.  Nondisplaced fracture of the left anterolateral third rib.  Displaced fracture of the left anterolateral fourth rib.     No results found for this or any previous visit.             Assessment and Plan  Diagnoses and all orders for this visit:    1. Lung nodules " (Primary)  Overview:  CT Chest Without Contrast Diagnostic (11/03/2023 17:07 EDT)   Impression    1.  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  2.  3 mm left lower lobe noncalcified nodule is likely benign.  No follow-up needed.  3.  Displaced fracture of the left clavicle.  Nondisplaced fracture of the left anterolateral third rib.  Displaced fracture of the left anterolateral fourth rib.     RADIOLOGY - SCAN - [facility]--CT CHEST WO CONTRAST--06/14/24 (06/14/2024)         Assessment & Plan:  Obtain follow-up CT of the chest in December 2024.  Order was placed.    Orders:  -     CT Chest Without Contrast; Future    2. Obesity (BMI 30-39.9)  Assessment & Plan:  Patient's (Body mass index is 31.2 kg/m².) Recommend weight loss, defer to PCP.         3. Seasonal allergies  Assessment & Plan:  He suffers from severe seasonal allergies, particularly to dust, which he manages without medication.     Consider over-the-counter antihistamine.          Follow Up  EVER Warner  8/23/2024  15:34 CDT    Return in about 5 months (around 1/14/2025) for CPFT, CT.    Patient was given instructions and counseling regarding his condition or for health maintenance advice. Please see specific information pulled into the AVS if appropriate.     Please note that portions of this note were completed with a voice recognition program.    Patient or patient representative verbalized consent for the use of Ambient Listening during the visit with  EVER Warner for chart documentation. 8/23/2024  15:31 CDT

## 2024-08-23 PROBLEM — J30.2 SEASONAL ALLERGIES: Status: ACTIVE | Noted: 2024-08-23

## 2024-08-23 PROBLEM — R91.8 LUNG NODULES: Status: ACTIVE | Noted: 2024-08-23

## 2024-08-23 NOTE — ASSESSMENT & PLAN NOTE
He suffers from severe seasonal allergies, particularly to dust, which he manages without medication.     Consider over-the-counter antihistamine.

## 2024-10-27 ENCOUNTER — HOSPITAL ENCOUNTER (EMERGENCY)
Age: 69
Discharge: HOME | End: 2024-10-27
Payer: MEDICARE

## 2024-10-27 ENCOUNTER — APPOINTMENT (OUTPATIENT)
Dept: GENERAL RADIOLOGY | Age: 69
End: 2024-10-27
Payer: MEDICARE

## 2024-10-27 VITALS
HEART RATE: 91 BPM | BODY MASS INDEX: 30.54 KG/M2 | DIASTOLIC BLOOD PRESSURE: 108 MMHG | RESPIRATION RATE: 16 BRPM | SYSTOLIC BLOOD PRESSURE: 166 MMHG | WEIGHT: 195 LBS | TEMPERATURE: 97.9 F | OXYGEN SATURATION: 98 %

## 2024-10-27 DIAGNOSIS — S46.912A STRAIN OF LEFT SHOULDER, INITIAL ENCOUNTER: Primary | ICD-10-CM

## 2024-10-27 PROCEDURE — 99283 EMERGENCY DEPT VISIT LOW MDM: CPT

## 2024-10-27 PROCEDURE — 73030 X-RAY EXAM OF SHOULDER: CPT

## 2024-10-27 PROCEDURE — 6370000000 HC RX 637 (ALT 250 FOR IP): Performed by: NURSE PRACTITIONER

## 2024-10-27 PROCEDURE — 73000 X-RAY EXAM OF COLLAR BONE: CPT

## 2024-10-27 RX ORDER — OXYCODONE AND ACETAMINOPHEN 5; 325 MG/1; MG/1
1 TABLET ORAL ONCE
Status: COMPLETED | OUTPATIENT
Start: 2024-10-27 | End: 2024-10-27

## 2024-10-27 RX ORDER — OXYCODONE AND ACETAMINOPHEN 5; 325 MG/1; MG/1
1 TABLET ORAL EVERY 6 HOURS PRN
Qty: 12 TABLET | Refills: 0 | Status: SHIPPED | OUTPATIENT
Start: 2024-10-27 | End: 2024-10-30

## 2024-10-27 RX ORDER — ONDANSETRON 4 MG/1
4 TABLET, ORALLY DISINTEGRATING ORAL ONCE
Status: COMPLETED | OUTPATIENT
Start: 2024-10-27 | End: 2024-10-27

## 2024-10-27 RX ORDER — ONDANSETRON 4 MG/1
4 TABLET, FILM COATED ORAL 3 TIMES DAILY PRN
Qty: 15 TABLET | Refills: 0 | Status: SHIPPED | OUTPATIENT
Start: 2024-10-27

## 2024-10-27 RX ADMIN — OXYCODONE HYDROCHLORIDE AND ACETAMINOPHEN 1 TABLET: 5; 325 TABLET ORAL at 17:11

## 2024-10-27 RX ADMIN — ONDANSETRON 4 MG: 4 TABLET, ORALLY DISINTEGRATING ORAL at 17:11

## 2024-10-27 ASSESSMENT — PAIN SCALES - GENERAL: PAINLEVEL_OUTOF10: 10

## 2024-10-27 ASSESSMENT — PAIN DESCRIPTION - ORIENTATION: ORIENTATION: LEFT

## 2024-10-27 ASSESSMENT — PAIN - FUNCTIONAL ASSESSMENT: PAIN_FUNCTIONAL_ASSESSMENT: 0-10

## 2024-10-27 ASSESSMENT — LIFESTYLE VARIABLES: HOW OFTEN DO YOU HAVE A DRINK CONTAINING ALCOHOL: NEVER

## 2024-10-27 NOTE — ED PROVIDER NOTES
[facility] EMERGENCY DEPT  EMERGENCY DEPARTMENT ENCOUNTER      Pt Name: John Cleary  MRN: [id number]  Birthdate 1955  Date of evaluation: 10/27/2024  Provider: DUKE Mock NP    CHIEF COMPLAINT       Chief Complaint   Patient presents with    Shoulder Injury     Pt reports falling and landing on left shoulder. He broke his clavicle a year ago and is afraid he broke it again.          HISTORY OF PRESENT ILLNESS   (Location/Symptom, Timing/Onset,Context/Setting, Quality, Duration, Modifying Factors, Severity)  Note limiting factors.   John Cleary is a 69 y.o. male who presents to the emergency department with complaint of left shoulder pain.  He was pulling a small cart in his yard when he tripped and landed directly onto his left shoulder.  He had a prior clavicular fracture on the left approximately 1 year ago.  He denies any other areas of pain and did not strike his head.        The history is provided by the patient and the spouse.       NursingNotes were reviewed.    REVIEW OF SYSTEMS    (2-9 systems for level 4, 10 or more for level 5)     Review of Systems   Respiratory: Negative.     Cardiovascular: Negative.    Musculoskeletal:  Positive for arthralgias and myalgias.   Skin: Negative.    All other systems reviewed and are negative.      A complete review of systems was performed and is negative except as noted above in the HPI.       PAST MEDICAL HISTORY     Past Medical History:   Diagnosis Date    Enlarged prostate          SURGICAL HISTORY     No past surgical history on file.      CURRENT MEDICATIONS       Previous Medications    ASPIRIN 81 MG CHEWABLE TABLET    Take 1 tablet by mouth daily    TAMSULOSIN (FLOMAX) 0.4 MG CAPSULE    Take 1 capsule by mouth daily       ALLERGIES     Demerol hcl [meperidine]    FAMILY HISTORY     No family history on file.       SOCIAL HISTORY       Social History     Socioeconomic History    Marital status:    Tobacco Use    Smoking status: Never

## 2024-10-27 NOTE — DISCHARGE INSTRUCTIONS
Wear your sling for the next week.  Pain medication as prescribed.  Do not take the muscle relaxer at the same time you take the pain medication.  Be sure you are drinking plenty of water with these medications.  Follow-up with the orthopedic group if symptoms persist or if you do not notice any improvement over the next week.  Return to ER for any new, worsening, or change in condition.

## (undated) DEVICE — PK TURNOVER CYSTO RM

## (undated) DEVICE — FIBR LASR MOSES 550 DFL 6J 80HZ 120W

## (undated) DEVICE — GLV SURG BIOGEL M LTX PF 8

## (undated) DEVICE — PK CYSTO 30

## (undated) DEVICE — EVAC BLDR UROVAC W ADAPT